# Patient Record
Sex: MALE | Race: ASIAN | NOT HISPANIC OR LATINO | Employment: UNEMPLOYED | URBAN - METROPOLITAN AREA
[De-identification: names, ages, dates, MRNs, and addresses within clinical notes are randomized per-mention and may not be internally consistent; named-entity substitution may affect disease eponyms.]

---
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
## 2019-04-02 ENCOUNTER — HOSPITAL ENCOUNTER (EMERGENCY)
Facility: MEDICAL CENTER | Age: 19
End: 2019-04-02
Attending: EMERGENCY MEDICINE
Payer: MEDICAID

## 2019-04-02 VITALS
TEMPERATURE: 98.2 F | SYSTOLIC BLOOD PRESSURE: 119 MMHG | OXYGEN SATURATION: 96 % | DIASTOLIC BLOOD PRESSURE: 78 MMHG | HEART RATE: 67 BPM | HEIGHT: 67 IN | RESPIRATION RATE: 17 BRPM | BODY MASS INDEX: 22.46 KG/M2 | WEIGHT: 143.08 LBS
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
## 2019-04-02 DIAGNOSIS — S06.0X0A CONCUSSION WITHOUT LOSS OF CONSCIOUSNESS, INITIAL ENCOUNTER: ICD-10-CM

## 2019-04-02 PROCEDURE — 99283 EMERGENCY DEPT VISIT LOW MDM: CPT
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
## 2019-06-07 ENCOUNTER — TELEPHONE (OUTPATIENT)
Dept: NEUROLOGY | Facility: MEDICAL CENTER | Age: 19
End: 2019-06-07

## 2019-06-07 NOTE — TELEPHONE ENCOUNTER
Spoke to pt mom and let her know dr. escobedo resigned from [facility] and we will need to reschedule him with another provider. Mom stated she will notify pt and call us back, I advised her that the appt will be cancelled.

## 2020-03-19 ENCOUNTER — OFFICE VISIT (OUTPATIENT)
Dept: URGENT CARE | Facility: PHYSICIAN GROUP | Age: 20
End: 2020-03-19
Payer: MEDICAID

## 2020-03-19 ENCOUNTER — TELEPHONE (OUTPATIENT)
Dept: HEALTH INFORMATION MANAGEMENT | Facility: OTHER | Age: 20
End: 2020-03-19

## 2020-03-19 VITALS
HEIGHT: 69 IN | RESPIRATION RATE: 16 BRPM | BODY MASS INDEX: 21.44 KG/M2 | DIASTOLIC BLOOD PRESSURE: 70 MMHG | TEMPERATURE: 98.5 F | HEART RATE: 80 BPM | SYSTOLIC BLOOD PRESSURE: 120 MMHG | OXYGEN SATURATION: 98 %

## 2020-03-19 DIAGNOSIS — J02.9 PHARYNGITIS, UNSPECIFIED ETIOLOGY: ICD-10-CM

## 2020-03-19 DIAGNOSIS — J34.89 RHINORRHEA: ICD-10-CM

## 2020-03-19 LAB
FLUAV+FLUBV AG SPEC QL IA: NEGATIVE
INT CON NEG: NORMAL
INT CON NEG: NORMAL
INT CON POS: NORMAL
INT CON POS: NORMAL
S PYO AG THROAT QL: NEGATIVE

## 2020-03-19 PROCEDURE — 87880 STREP A ASSAY W/OPTIC: CPT | Performed by: FAMILY MEDICINE

## 2020-03-19 PROCEDURE — 87804 INFLUENZA ASSAY W/OPTIC: CPT | Performed by: FAMILY MEDICINE

## 2020-03-19 PROCEDURE — 99203 OFFICE O/P NEW LOW 30 MIN: CPT | Performed by: FAMILY MEDICINE

## 2020-03-19 ASSESSMENT — ENCOUNTER SYMPTOMS
EYE DISCHARGE: 0
MYALGIAS: 0
NAUSEA: 0
EYE REDNESS: 0
WEIGHT LOSS: 0
VOMITING: 0

## 2020-03-19 NOTE — PROGRESS NOTES
"Subjective:      Sang Magaña is a 19 y.o. male who presents with Sore Throat (nasel congestion, woke up with it this morning)            Onset this morning ST and nasal congestion. No fever. No cough. No earache. Tolerating fluids with normal urine output. No OTC medications for mild symptoms. No other aggravating or alleviating factors.        Review of Systems   Constitutional: Negative for malaise/fatigue and weight loss.   Eyes: Negative for discharge and redness.   Gastrointestinal: Negative for nausea and vomiting.   Musculoskeletal: Negative for joint pain and myalgias.   Skin: Negative for itching and rash.     .  Medications, Allergies, and current problem list reviewed today in Epic       Objective:     /70   Pulse 80   Temp 36.9 °C (98.5 °F) (Temporal)   Resp 16   Ht 1.74 m (5' 8.5\")   SpO2 98%   BMI 21.44 kg/m²      Physical Exam  Constitutional:       General: He is not in acute distress.     Appearance: He is well-developed.   HENT:      Head: Normocephalic and atraumatic.      Right Ear: Tympanic membrane normal.      Left Ear: Tympanic membrane normal.      Nose: Congestion and rhinorrhea present.      Mouth/Throat:      Pharynx: Posterior oropharyngeal erythema ( Mild) present. No oropharyngeal exudate.   Eyes:      Conjunctiva/sclera: Conjunctivae normal.   Neck:      Musculoskeletal: Neck supple.   Cardiovascular:      Rate and Rhythm: Normal rate and regular rhythm.      Heart sounds: Normal heart sounds. No murmur.   Pulmonary:      Effort: Pulmonary effort is normal.      Breath sounds: Normal breath sounds. No wheezing.   Skin:     General: Skin is warm and dry.      Findings: No rash.   Neurological:      Mental Status: He is alert and oriented to person, place, and time.                 Assessment/Plan:     POCT strep negative  POCT influenza negative    1. Pharyngitis, unspecified etiology  POCT Rapid Strep A    POCT Influenza A/B   2. Rhinorrhea       Differential " diagnosis, natural history, supportive care, and indications for immediate follow-up discussed at length.     Symptoms are mild without evidence of lower respiratory infection or fever.  Low probability of COVID19 without known exposure however self-isolation strategies discussed.

## 2020-03-19 NOTE — TELEPHONE ENCOUNTER
1. Caller Name: Sang                        Call Back Number: [phone]  [facility] PCP or Specialty Provider: Joanie Raya        2.  Does patient have any active symptoms of respiratory illness (fever OR cough OR shortness of breath)? Yes, the patient reports the following respiratory symptoms: sore throat, snuffy nose x 1 day. Temp 97.3 today.    3.  Does patient have any comoribidities? None     4.  In the last 30 days, has the patient traveled outside of the country OR in a high risk area within the  OR have any known contact with someone who has or is suspected to have COVID-19?  No.    5. Disposition: Advised to perform self care, monitor for worsening symptoms and to call back in 3 days if no improvement. He verbalized agreement and understanding.

## 2020-03-23 RX ORDER — LIDOCAINE HYDROCHLORIDE 20 MG/ML
15 SOLUTION OROPHARYNGEAL EVERY 4 HOURS PRN
Qty: 120 ML | Refills: 0 | Status: SHIPPED | OUTPATIENT
Start: 2020-03-23 | End: 2023-05-16

## 2020-09-07 ENCOUNTER — HOSPITAL ENCOUNTER (EMERGENCY)
Facility: MEDICAL CENTER | Age: 20
End: 2020-09-07
Attending: EMERGENCY MEDICINE | Admitting: EMERGENCY MEDICINE
Payer: MEDICAID

## 2020-09-07 VITALS
DIASTOLIC BLOOD PRESSURE: 71 MMHG | RESPIRATION RATE: 18 BRPM | OXYGEN SATURATION: 97 % | BODY MASS INDEX: 23.99 KG/M2 | SYSTOLIC BLOOD PRESSURE: 133 MMHG | TEMPERATURE: 98.2 F | WEIGHT: 158.29 LBS | HEIGHT: 68 IN | HEART RATE: 59 BPM

## 2020-09-07 DIAGNOSIS — M77.9 TENDONITIS: ICD-10-CM

## 2020-09-07 DIAGNOSIS — M25.521 RIGHT ELBOW PAIN: ICD-10-CM

## 2020-09-07 PROCEDURE — 302874 HCHG BANDAGE ACE 2 OR 3""

## 2020-09-07 PROCEDURE — 99283 EMERGENCY DEPT VISIT LOW MDM: CPT

## 2020-09-07 PROCEDURE — 96372 THER/PROPH/DIAG INJ SC/IM: CPT

## 2020-09-07 PROCEDURE — 700111 HCHG RX REV CODE 636 W/ 250 OVERRIDE (IP): Performed by: EMERGENCY MEDICINE

## 2020-09-07 RX ORDER — NAPROXEN 500 MG/1
500 TABLET ORAL 2 TIMES DAILY WITH MEALS
Qty: 60 TAB | Refills: 0 | Status: SHIPPED | OUTPATIENT
Start: 2020-09-07 | End: 2023-05-16

## 2020-09-07 RX ORDER — KETOROLAC TROMETHAMINE 30 MG/ML
30 INJECTION, SOLUTION INTRAMUSCULAR; INTRAVENOUS ONCE
Status: COMPLETED | OUTPATIENT
Start: 2020-09-07 | End: 2020-09-07

## 2020-09-07 RX ADMIN — KETOROLAC TROMETHAMINE 30 MG: 30 INJECTION, SOLUTION INTRAMUSCULAR at 20:39

## 2020-09-07 SDOH — HEALTH STABILITY: MENTAL HEALTH: HOW MANY STANDARD DRINKS CONTAINING ALCOHOL DO YOU HAVE ON A TYPICAL DAY?: 1 OR 2

## 2020-09-07 SDOH — HEALTH STABILITY: MENTAL HEALTH: HOW OFTEN DO YOU HAVE A DRINK CONTAINING ALCOHOL?: MONTHLY OR LESS

## 2020-09-07 SDOH — HEALTH STABILITY: MENTAL HEALTH: HOW OFTEN DO YOU HAVE 6 OR MORE DRINKS ON ONE OCCASION?: NEVER

## 2020-09-07 ASSESSMENT — ENCOUNTER SYMPTOMS
SHORTNESS OF BREATH: 0
SENSORY CHANGE: 0

## 2020-09-08 NOTE — ED PROVIDER NOTES
ED Provider Note    Scribed for Maria Elena Corona M.D. by Allan Morales. 9/7/2020, 8:24 PM.    Primary care provider: Rian Raya D.N.P.  Means of arrival: walk in  History obtained from: patient  History limited by: none    CHIEF COMPLAINT  Chief Complaint   Patient presents with   • Arm Pain     R upper arm, denies trauma, no deformity noted.    PPE Note: I personally donned PPE for all patient encounters during this visit, including a surgical mask and gloves. Scribe remained outside the patient's room and did not have any contact with the patient for the duration of patient encounter.      DENA Magaña is a 20 y.o. male who presents to the Emergency Department with moderate throbbing right arm pain spontaneously onset earlier today.  Per the patient, earlier today his right arm suddenly began hurting, he noticed after bike riding today. He states that he ignored the pain and later on boxed with a friend; afterwards the pain worsened, which is why he is in the ED today.  He denies hitting his arm or falling on it.  He describes that the pain is localized to the right elbow but cannot localize it to the medial or lateral aspect of the elbow.  Prior to arrival, he took 500 mg of tylenol with mild alleviation; his pain is exacerbated by bending his arm.  Denies numbness, tingling or weakness.    REVIEW OF SYSTEMS  Review of Systems   Respiratory: Negative for shortness of breath.    Cardiovascular: Negative for chest pain.   Musculoskeletal: Positive for joint pain.        Right arm pain worse around the elbow   Neurological: Negative for sensory change.     PAST MEDICAL HISTORY   has a past medical history of Epilepsy (HCC), Hypoactive thyroid (2010), and Seizure disorder (HCC).    SURGICAL HISTORY  patient denies any surgical history    SOCIAL HISTORY  Social History     Tobacco Use   • Smoking status: Former Smoker   • Smokeless tobacco: Never Used   Substance Use Topics   • Alcohol use: Yes      "Frequency: Monthly or less     Drinks per session: 1 or 2     Binge frequency: Never   • Drug use: No      Social History     Substance and Sexual Activity   Drug Use No       FAMILY HISTORY  History reviewed. No pertinent family history.    CURRENT MEDICATIONS  Current Outpatient Medications   Medication Instructions   • divalproex ER (DEPAKOTE ER) 500 mg, Oral, 2 TIMES DAILY   • lamotrigine (LAMICTAL) 200 mg, Oral, DAILY   • levETIRAcetam (KEPPRA) 500 mg, Oral, 2 TIMES DAILY   • levothyroxine (SYNTHROID) 88 mcg, Oral, EACH MORNING ON EMPTY STOMACH   • lidocaine (XYLOCAINE) 2 % Solution 15 mL, Oral, EVERY 4 HOURS PRN, Gargle and spit every 4 hours as needed     ALLERGIES  No Known Allergies    PHYSICAL EXAM  VITAL SIGNS: /79   Pulse 76   Temp 36.2 °C (97.2 °F)   Resp 16   Ht 1.727 m (5' 8\")   Wt 71.8 kg (158 lb 4.6 oz)   SpO2 97%   BMI 24.07 kg/m²   Vitals reviewed by myself.  Physical Exam   Nursing note and vitals reviewed.  Constitutional: Well-developed and well-nourished. No acute distress.   HENT: Head is normocephalic and atraumatic.  Eyes: extra-ocular movements intact  Cardiovascular: Regular rate and regular rhythm. No murmur heard.  2+ bilateral radial pulses  Pulmonary/Chest: Breath sounds normal. No wheezes or rales.   Musculoskeletal: Extremities exhibit normal range of motion without edema or tenderness.  Patient has full range of motion of bilateral shoulders, elbows and wrists.  No swelling or deformity noted to the arm or forearms bilaterally.  No point tenderness to palpation along the lateral or medial epicondyle.  Neurological: Awake and alert, sensation intact to median, ulnar, axillary and radial nerve distributions bilaterally.  Skin: Skin is warm and dry. No rash.       REASSESSMENT    8:24 PM - Patient seen and examined at bedside. Discussed plan of care, including my plans for discharge. I offered to order imaging to rule out any breaks in his arms but the patient stated that " he didn't want imaging as he is confident he didn't break the bone. The patient will be medicated with Toradol 30mg.     COURSE & MEDICAL DECISION MAKING  Nursing notes, VS, PMSFHx reviewed in chart.    Patient is a 20-year-old male who comes in for evaluation of right upper extremity pain.  On physical exam patient is well-appearing with vitals in normal limits.  He has intact distal pulses and warm extremities ruling out arterial occlusion.  There is no obvious swelling or tenderness to palpation of the forearm or arm and patient has no risk factors for blood clot making DVT and superficial thrombophlebitis unlikely.  I asked patient if he uses IV drugs and he declines IV drug use.  There is no erythema or warmth to the extremity concerning for infection.  I advised him that he likely has a musculoskeletal strain or tendinitis after riding his bike.  I offered him x-ray to rule out bony abnormality although patient declined this.  Therefore I will start him on anti-inflammatories and have him follow-up with [facility] if his symptoms do not improve.  Patient is amenable to this plan.  He is given dose of Toradol in the emergency department, provided with prescription for naproxen and given strict return precautions.  Patient is then discharged in stable condition.    The patient will return for new or worsening symptoms and is stable at the time of discharge.    The patient is referred to a primary physician for blood pressure management, diabetic screening, and for all other preventative health concerns.    DISPOSITION:  Patient will be discharged home in stable condition.    FOLLOW UP:  [facility]  [address]  [facility] [address] [phone]          OUTPATIENT MEDICATIONS:  New Prescriptions    NAPROXEN (NAPROSYN) 500 MG TAB    Take 1 Tab by mouth 2 times a day, with meals.       FINAL IMPRESSION  1. Tendonitis    2. Right elbow pain          IAllan), audra  scribing for, and in the presence of, Maria Elena Corona M.D..    Electronically signed by: Allan Morales (Scribe), 9/7/2020    I, Maria Elena Corona M.D. personally performed the services described in this documentation, as scribed by Allan Morales in my presence, and it is both accurate and complete.    E    The note accurately reflects work and decisions made by me.  Maria Elena Corona M.D.  9/7/2020  8:39 PM

## 2020-09-08 NOTE — ED TRIAGE NOTES
Chief Complaint   Patient presents with   • Arm Pain     R upper arm, denies trauma, no deformity noted.      Patient to triage via ambulaiton, with a steady gait, patient A&O x4. Radial pulse 2+, denies n/t.      Explained wait time and triage process to pt. Pt placed back in lobby, told to notify ED tech or triage RN of any changes, verbalized understanding.

## 2020-10-01 ENCOUNTER — HOSPITAL ENCOUNTER (OUTPATIENT)
Dept: LAB | Facility: MEDICAL CENTER | Age: 20
End: 2020-10-01
Attending: PSYCHIATRY & NEUROLOGY
Payer: MEDICAID

## 2020-10-01 LAB — RHEUMATOID FACT SER IA-ACNC: <10 IU/ML (ref 0–14)

## 2020-10-01 PROCEDURE — 84443 ASSAY THYROID STIM HORMONE: CPT

## 2020-10-01 PROCEDURE — 86800 THYROGLOBULIN ANTIBODY: CPT

## 2020-10-01 PROCEDURE — 86431 RHEUMATOID FACTOR QUANT: CPT

## 2020-10-01 PROCEDURE — 36415 COLL VENOUS BLD VENIPUNCTURE: CPT

## 2020-10-01 PROCEDURE — 86376 MICROSOMAL ANTIBODY EACH: CPT

## 2020-10-02 LAB
THYROPEROXIDASE AB SERPL-ACNC: <9 IU/ML (ref 0–9)
TSH SERPL DL<=0.005 MIU/L-ACNC: 1.29 UIU/ML (ref 0.38–5.33)

## 2020-10-03 LAB — THYROGLOB AB SERPL-ACNC: <0.9 IU/ML (ref 0–4)

## 2020-11-01 ENCOUNTER — HOSPITAL ENCOUNTER (EMERGENCY)
Facility: MEDICAL CENTER | Age: 20
End: 2020-11-01
Attending: EMERGENCY MEDICINE
Payer: MEDICAID

## 2020-11-01 VITALS
DIASTOLIC BLOOD PRESSURE: 63 MMHG | BODY MASS INDEX: 22.88 KG/M2 | TEMPERATURE: 99 F | HEIGHT: 68 IN | RESPIRATION RATE: 16 BRPM | HEART RATE: 65 BPM | WEIGHT: 151 LBS | OXYGEN SATURATION: 98 % | SYSTOLIC BLOOD PRESSURE: 125 MMHG

## 2020-11-01 DIAGNOSIS — Z20.822 SUSPECTED COVID-19 VIRUS INFECTION: ICD-10-CM

## 2020-11-01 DIAGNOSIS — J06.9 VIRAL URI WITH COUGH: ICD-10-CM

## 2020-11-01 LAB
COVID ORDER STATUS COVID19: NORMAL
S PYO DNA SPEC NAA+PROBE: NOT DETECTED
SARS-COV-2 RNA RESP QL NAA+PROBE: NOTDETECTED
SPECIMEN SOURCE: NORMAL

## 2020-11-01 PROCEDURE — C9803 HOPD COVID-19 SPEC COLLECT: HCPCS | Performed by: EMERGENCY MEDICINE

## 2020-11-01 PROCEDURE — U0003 INFECTIOUS AGENT DETECTION BY NUCLEIC ACID (DNA OR RNA); SEVERE ACUTE RESPIRATORY SYNDROME CORONAVIRUS 2 (SARS-COV-2) (CORONAVIRUS DISEASE [COVID-19]), AMPLIFIED PROBE TECHNIQUE, MAKING USE OF HIGH THROUGHPUT TECHNOLOGIES AS DESCRIBED BY CMS-2020-01-R: HCPCS

## 2020-11-01 PROCEDURE — 87651 STREP A DNA AMP PROBE: CPT

## 2020-11-01 PROCEDURE — 99283 EMERGENCY DEPT VISIT LOW MDM: CPT

## 2020-11-01 ASSESSMENT — LIFESTYLE VARIABLES: DO YOU DRINK ALCOHOL: NO

## 2020-11-01 NOTE — ED TRIAGE NOTES
"Chief Complaint   Patient presents with   • Cough     x 1 day. congested    • Sore Throat     x 1 day       Pt amb to triage with steady gait for above complaint. Also c/o chills. Was around a large group of people over past few days, unsure of covid exposure.     Pt is alert and oriented, speaking in full sentences, follows commands and responds appropriately to questions. Resp are even and unlabored. No behavioral indicators of pain.   Pt placed in lobby. Pt educated on triage process. Pt encouraged to alert staff for any changes.    /74   Pulse 77   Temp 37.2 °C (99 °F) (Temporal)   Resp 14   Ht 1.727 m (5' 8\")   Wt 68.5 kg (151 lb)   SpO2 96%   BMI 22.96 kg/m²     "

## 2020-11-01 NOTE — ED PROVIDER NOTES
ED Provider Note    CHIEF COMPLAINT  Chief Complaint   Patient presents with   • Cough     x 1 day. congested    • Sore Throat     x 1 day       HPI  Sang Magaña is a 20 y.o. male who presents with sore throat, malaise, and cough.  Onset of symptoms yesterday, gradually worsening.  No shortness of breath.  No chest pain, no abdominal pain.  No vomiting, no loss of taste.  Patient denies known exposure to COVID-19.  Patient states he occasionally vapes otherwise does not smoke.  Activity worsens cough    REVIEW OF SYSTEMS  Constitutional: Malaise  Respiratory: Cough  ENT sore throat  Gastrointestinal: No abdominal pain  Musculoskeletal: No back pain    PAST MEDICAL HISTORY  Past Medical History:   Diagnosis Date   • Epilepsy (HCC)    • Hypoactive thyroid 2010   • Seizure disorder (HCC)        FAMILY HISTORY  No family history on file.    SOCIAL HISTORY  Social History     Socioeconomic History   • Marital status: Single     Spouse name: Not on file   • Number of children: Not on file   • Years of education: Not on file   • Highest education level: Not on file   Occupational History   • Not on file   Social Needs   • Financial resource strain: Not on file   • Food insecurity     Worry: Not on file     Inability: Not on file   • Transportation needs     Medical: Not on file     Non-medical: Not on file   Tobacco Use   • Smoking status: Former Smoker   • Smokeless tobacco: Never Used   Substance and Sexual Activity   • Alcohol use: Yes     Frequency: Monthly or less     Drinks per session: 1 or 2     Binge frequency: Never   • Drug use: No   • Sexual activity: Not on file   Lifestyle   • Physical activity     Days per week: Not on file     Minutes per session: Not on file   • Stress: Not on file   Relationships   • Social connections     Talks on phone: Not on file     Gets together: Not on file     Attends [employer] service: Not on file     Active member of club or organization: Not on file     Attends meetings  "of clubs or organizations: Not on file     Relationship status: Not on file   • Intimate partner violence     Fear of current or ex partner: Not on file     Emotionally abused: Not on file     Physically abused: Not on file     Forced sexual activity: Not on file   Other Topics Concern   • Not on file   Social History Narrative    ** Merged History Encounter **            SURGICAL HISTORY  History reviewed. No pertinent surgical history.    CURRENT MEDICATIONS  No current facility-administered medications on file prior to encounter.      Current Outpatient Medications on File Prior to Encounter   Medication Sig Dispense Refill   • naproxen (NAPROSYN) 500 MG Tab Take 1 Tab by mouth 2 times a day, with meals. 60 Tab 0   • lidocaine (XYLOCAINE) 2 % Solution Take 15 mL by mouth every four hours as needed for Throat/Mouth Pain. Gargle and spit every 4 hours as needed 120 mL 0   • lamotrigine (LAMICTAL) 200 MG tablet Take 1 Tab by mouth every day. 90 Tab 1   • divalproex ER (DEPAKOTE ER) 500 MG TABLET SR 24 HR Take 1 Tab by mouth 2 Times a Day. 180 Tab 1   • levETIRAcetam (KEPPRA) 500 MG Tab Take 1 Tab by mouth 2 times a day. 180 Tab 1   • levothyroxine (SYNTHROID) 88 MCG Tab Take 1 Tab by mouth Every morning on an empty stomach. 30 Tab 0       ALLERGIES  No Known Allergies    PHYSICAL EXAM  VITAL SIGNS: /70   Pulse 60   Temp 37.2 °C (99 °F) (Temporal)   Resp 16   Ht 1.727 m (5' 8\")   Wt 68.5 kg (151 lb)   SpO2 95%   BMI 22.96 kg/m²   Constitutional:  Well nourished, No acute distress.   HENT: Posterior pharynx erythematous, no exudate, no asymmetric swelling  Lymphatics: Mild symmetrical adenopathy  Eyes:  Conjunctiva normal, No discharge.    Cardiovascular: The heart is regular rhythm, normal rate  Pulmonary: Lungs with mildly diminished breath sounds bilateral, no crackles or wheezing  Skin: No cyanosis.   Musculoskeletal: Neck nontender   Neurologic: speech is clear, no ataxia   Psychiatric:  Mood normal. "  Cooperative    Results for orders placed or performed during the hospital encounter of 11/01/20   Group A Strep by PCR    Specimen: Throat   Result Value Ref Range    Group A Strep by PCR Not Detected Not Detected   COVID/SARS CoV-2 PCR    Specimen: Nasopharyngeal; Respirate   Result Value Ref Range    COVID Order Status Received    SARS-CoV-2, PCR (In-House)   Result Value Ref Range    SARS-CoV-2 Source NP Swab            COURSE & MEDICAL DECISION MAKING  Pertinent Labs & Imaging studies reviewed. (See chart for details)  Patient with viral symptoms, cough, sore throat.  His strep test is negative.  Symptoms are suspicious for COVID-19 infection, patient advised to self isolate and socially distance until results are known then act accordingly.  Per protocol, patient will be contacted tomorrow if his results are positive.  Known Covid causes of viral URI are also possible.  Patient has normal pulse oximetry, occasionally coughs, otherwise no respiratory distress.  He is stable for discharge    FINAL IMPRESSION     1. Viral URI with cough     2. Suspected COVID-19 virus infection                  Electronically signed by: Dada Hernandes M.D., 11/1/2020 7:38 AM

## 2020-11-05 ENCOUNTER — NURSE TRIAGE (OUTPATIENT)
Dept: HEALTH INFORMATION MANAGEMENT | Facility: OTHER | Age: 20
End: 2020-11-05

## 2020-11-05 NOTE — TELEPHONE ENCOUNTER
Recently tested for covid, wants results.  Referred to main lab @ [facility].

## 2021-01-06 ENCOUNTER — HOSPITAL ENCOUNTER (EMERGENCY)
Facility: MEDICAL CENTER | Age: 21
End: 2021-01-06
Attending: EMERGENCY MEDICINE | Admitting: EMERGENCY MEDICINE
Payer: MEDICAID

## 2021-01-06 VITALS
DIASTOLIC BLOOD PRESSURE: 71 MMHG | HEART RATE: 60 BPM | BODY MASS INDEX: 24.25 KG/M2 | OXYGEN SATURATION: 96 % | HEIGHT: 68 IN | SYSTOLIC BLOOD PRESSURE: 102 MMHG | TEMPERATURE: 98.8 F | WEIGHT: 160 LBS | RESPIRATION RATE: 15 BRPM

## 2021-01-06 DIAGNOSIS — R56.9 SEIZURE (HCC): ICD-10-CM

## 2021-01-06 LAB
ALBUMIN SERPL BCP-MCNC: 4.5 G/DL (ref 3.2–4.9)
ALBUMIN/GLOB SERPL: 2 G/DL
ALP SERPL-CCNC: 55 U/L (ref 30–99)
ALT SERPL-CCNC: 14 U/L (ref 2–50)
ANION GAP SERPL CALC-SCNC: 11 MMOL/L (ref 7–16)
AST SERPL-CCNC: 20 U/L (ref 12–45)
BASOPHILS # BLD AUTO: 0.8 % (ref 0–1.8)
BASOPHILS # BLD: 0.05 K/UL (ref 0–0.12)
BILIRUB SERPL-MCNC: 0.3 MG/DL (ref 0.1–1.5)
BUN SERPL-MCNC: 17 MG/DL (ref 8–22)
CALCIUM SERPL-MCNC: 9.6 MG/DL (ref 8.5–10.5)
CHLORIDE SERPL-SCNC: 105 MMOL/L (ref 96–112)
CO2 SERPL-SCNC: 23 MMOL/L (ref 20–33)
CREAT SERPL-MCNC: 0.84 MG/DL (ref 0.5–1.4)
EKG IMPRESSION: NORMAL
EOSINOPHIL # BLD AUTO: 0.11 K/UL (ref 0–0.51)
EOSINOPHIL NFR BLD: 1.8 % (ref 0–6.9)
ERYTHROCYTE [DISTWIDTH] IN BLOOD BY AUTOMATED COUNT: 36.5 FL (ref 35.9–50)
ETHANOL BLD-MCNC: <10.1 MG/DL (ref 0–10)
GLOBULIN SER CALC-MCNC: 2.3 G/DL (ref 1.9–3.5)
GLUCOSE SERPL-MCNC: 120 MG/DL (ref 65–99)
HCT VFR BLD AUTO: 45.2 % (ref 42–52)
HGB BLD-MCNC: 16 G/DL (ref 14–18)
IMM GRANULOCYTES # BLD AUTO: 0.01 K/UL (ref 0–0.11)
IMM GRANULOCYTES NFR BLD AUTO: 0.2 % (ref 0–0.9)
LYMPHOCYTES # BLD AUTO: 2.35 K/UL (ref 1–4.8)
LYMPHOCYTES NFR BLD: 38.4 % (ref 22–41)
MAGNESIUM SERPL-MCNC: 1.9 MG/DL (ref 1.5–2.5)
MCH RBC QN AUTO: 31.5 PG (ref 27–33)
MCHC RBC AUTO-ENTMCNC: 35.4 G/DL (ref 33.7–35.3)
MCV RBC AUTO: 89 FL (ref 81.4–97.8)
MONOCYTES # BLD AUTO: 0.55 K/UL (ref 0–0.85)
MONOCYTES NFR BLD AUTO: 9 % (ref 0–13.4)
NEUTROPHILS # BLD AUTO: 3.05 K/UL (ref 1.82–7.42)
NEUTROPHILS NFR BLD: 49.8 % (ref 44–72)
NRBC # BLD AUTO: 0 K/UL
NRBC BLD-RTO: 0 /100 WBC
PLATELET # BLD AUTO: 229 K/UL (ref 164–446)
PMV BLD AUTO: 10.4 FL (ref 9–12.9)
POTASSIUM SERPL-SCNC: 3.9 MMOL/L (ref 3.6–5.5)
PROT SERPL-MCNC: 6.8 G/DL (ref 6–8.2)
RBC # BLD AUTO: 5.08 M/UL (ref 4.7–6.1)
SODIUM SERPL-SCNC: 139 MMOL/L (ref 135–145)
T4 FREE SERPL-MCNC: 1.37 NG/DL (ref 0.93–1.7)
TSH SERPL DL<=0.005 MIU/L-ACNC: 0.8 UIU/ML (ref 0.38–5.33)
WBC # BLD AUTO: 6.1 K/UL (ref 4.8–10.8)

## 2021-01-06 PROCEDURE — 700111 HCHG RX REV CODE 636 W/ 250 OVERRIDE (IP): Performed by: EMERGENCY MEDICINE

## 2021-01-06 PROCEDURE — 84439 ASSAY OF FREE THYROXINE: CPT

## 2021-01-06 PROCEDURE — 93005 ELECTROCARDIOGRAM TRACING: CPT | Performed by: EMERGENCY MEDICINE

## 2021-01-06 PROCEDURE — 700102 HCHG RX REV CODE 250 W/ 637 OVERRIDE(OP): Performed by: EMERGENCY MEDICINE

## 2021-01-06 PROCEDURE — 84443 ASSAY THYROID STIM HORMONE: CPT

## 2021-01-06 PROCEDURE — 82077 ASSAY SPEC XCP UR&BREATH IA: CPT

## 2021-01-06 PROCEDURE — 85025 COMPLETE CBC W/AUTO DIFF WBC: CPT

## 2021-01-06 PROCEDURE — 80053 COMPREHEN METABOLIC PANEL: CPT

## 2021-01-06 PROCEDURE — A9270 NON-COVERED ITEM OR SERVICE: HCPCS | Performed by: EMERGENCY MEDICINE

## 2021-01-06 PROCEDURE — 83735 ASSAY OF MAGNESIUM: CPT

## 2021-01-06 PROCEDURE — 99284 EMERGENCY DEPT VISIT MOD MDM: CPT

## 2021-01-06 PROCEDURE — 96374 THER/PROPH/DIAG INJ IV PUSH: CPT

## 2021-01-06 PROCEDURE — 700105 HCHG RX REV CODE 258: Performed by: EMERGENCY MEDICINE

## 2021-01-06 RX ORDER — ONDANSETRON 2 MG/ML
4 INJECTION INTRAMUSCULAR; INTRAVENOUS ONCE
Status: COMPLETED | OUTPATIENT
Start: 2021-01-06 | End: 2021-01-06

## 2021-01-06 RX ORDER — FOLIC ACID 1 MG/1
1 TABLET ORAL DAILY
Status: SHIPPED | COMMUNITY
End: 2023-05-19 | Stop reason: SDUPTHER

## 2021-01-06 RX ORDER — SODIUM CHLORIDE, SODIUM LACTATE, POTASSIUM CHLORIDE, CALCIUM CHLORIDE 600; 310; 30; 20 MG/100ML; MG/100ML; MG/100ML; MG/100ML
1000 INJECTION, SOLUTION INTRAVENOUS ONCE
Status: COMPLETED | OUTPATIENT
Start: 2021-01-06 | End: 2021-01-06

## 2021-01-06 RX ORDER — LEVETIRACETAM 500 MG/1
500 TABLET ORAL 2 TIMES DAILY
Status: DISCONTINUED | OUTPATIENT
Start: 2021-01-06 | End: 2021-01-06 | Stop reason: HOSPADM

## 2021-01-06 RX ORDER — DIVALPROEX SODIUM 500 MG/1
500 TABLET, EXTENDED RELEASE ORAL 2 TIMES DAILY
Status: DISCONTINUED | OUTPATIENT
Start: 2021-01-06 | End: 2021-01-06 | Stop reason: HOSPADM

## 2021-01-06 RX ADMIN — SODIUM CHLORIDE, POTASSIUM CHLORIDE, SODIUM LACTATE AND CALCIUM CHLORIDE 1000 ML: 600; 310; 30; 20 INJECTION, SOLUTION INTRAVENOUS at 06:26

## 2021-01-06 RX ADMIN — LEVETIRACETAM 500 MG: 500 TABLET ORAL at 08:11

## 2021-01-06 RX ADMIN — ONDANSETRON 4 MG: 2 INJECTION INTRAMUSCULAR; INTRAVENOUS at 06:26

## 2021-01-06 RX ADMIN — DIVALPROEX SODIUM 500 MG: 500 TABLET, EXTENDED RELEASE ORAL at 08:11

## 2021-01-06 NOTE — ED TRIAGE NOTES
".  Chief Complaint   Patient presents with   • Seizure     Patient has a history of seizures, his last seizure was a year ago. Per patient and his friend/\"provider\" patient started having seizure like activity but remained awake during the event. Patient A/Ox4 but laughing at everything stating \"why is everything so funny to me.\" Patient denies taking any other substances. Per provider patient forgot to take his depakote last night.      Patient brought in by EMS. Patient given 4mg Zofran per EMS. Patient reporting Nausea. Patient reports feeling tired. Provider/friend at bedside. Provider describes seizure like activity as fully body tremors with his eyes closed but patient was still able to speak.   "

## 2021-01-06 NOTE — ED PROVIDER NOTES
"ED Provider Note    CHIEF COMPLAINT  Chief Complaint   Patient presents with   • Seizure     Patient has a history of seizures, his last seizure was a year ago. Per patient and his friend/\"provider\" patient started having seizure like activity but remained awake during the event. Patient A/Ox4 but laughing at everything stating \"why is everything so funny to me.\" Patient denies taking any other substances. Per provider patient forgot to take his depakote last night.        HPI  Sang Magaña is a 20 y.o. male who presents after having seizure-like activity.  Patient has a history of seizure disorder.  He has been compliant with his medications aside from missing them yesterday.  Today he told his friend/\"provider\" that he was going to have a seizure.  He started thrashing his arms and legs around violently.  He was kicking his legs and arms.  His friend characterize this as appearing as if the patient was having a temper tantrum.  The patient was awake and alert during this event.  It lasted for about 35 minutes.  He did not have incontinence or tongue biting.  He denies any injury.  He currently feels nauseous.  He has not vomited.  He has been dealing with insomnia for the last few days and has been having a very hard time sleeping.  He is was watching a movie when this all started.  He denies fevers, chills, headache, weakness, numbness, neurologic symptoms.  No chest pain, shortness of breath or cough.  Denies dysuria hematuria frequency.  Denies abdominal pain.  No vomiting or diarrhea.  No rash.  No known ill contacts.  Denies drugs or alcohol.  No suicidal homicidal thoughts.  Denies hallucinations.    Reviewed medical record.  Patient followed by Dr. Mann.  There is been question of psychiatric issues or psychogenic seizure in the past.  He does have known epilepsy however.    REVIEW OF SYSTEMS  As per HPI, otherwise a 10 point review of systems is negative    PAST MEDICAL HISTORY  Past Medical History: " "  Diagnosis Date   • Epilepsy (HCC)    • Hypoactive thyroid 2010   • Seizure disorder (HCC)        SOCIAL HISTORY  Social History     Tobacco Use   • Smoking status: Former Smoker   • Smokeless tobacco: Never Used   Substance Use Topics   • Alcohol use: Yes     Frequency: Monthly or less     Drinks per session: 1 or 2     Binge frequency: Never   • Drug use: No       SURGICAL HISTORY  History reviewed. No pertinent surgical history.    CURRENT MEDICATIONS  Home Medications    **Home medications have not yet been reviewed for this encounter**         ALLERGIES  No Known Allergies    PHYSICAL EXAM  VITAL SIGNS: /84   Pulse 96   Temp 37.1 °C (98.8 °F) (Temporal)   Resp (!) 21   Ht 1.727 m (5' 8\")   Wt 72.6 kg (160 lb)   SpO2 98%   BMI 24.33 kg/m²    Constitutional: Awake and alert.  Somewhat slow to answer questions  HENT:  Atraumatic, Normocephalic.Oropharynx dry mucus membranes, Nose normal inspection.   Eyes: Normal inspection  Neck: Supple  Cardiovascular: Mildly elevated heart rate, Normal rhythm.  Symmetric peripheral pulses.   Thorax & Lungs: No respiratory distress, No wheezing, No rales, No rhonchi, No chest tenderness.   Abdomen: Bowel sounds normal, soft, non-distended, nontender, no mass  Skin: Warm, Dry, No rash.   Back: No tenderness, No CVA tenderness.   Extremities: No clubbing, cyanosis, edema, no Homans or cords   Neurologic: Awake and alert.  Oriented to person place time.  Moves all extremities symmetrically with good strength.  Normal sensory.  Psychiatric: Anxious appearing        Labs:  Results for orders placed or performed during the hospital encounter of 01/06/21   CBC WITH DIFFERENTIAL   Result Value Ref Range    WBC 6.1 4.8 - 10.8 K/uL    RBC 5.08 4.70 - 6.10 M/uL    Hemoglobin 16.0 14.0 - 18.0 g/dL    Hematocrit 45.2 42.0 - 52.0 %    MCV 89.0 81.4 - 97.8 fL    MCH 31.5 27.0 - 33.0 pg    MCHC 35.4 (H) 33.7 - 35.3 g/dL    RDW 36.5 35.9 - 50.0 fL    Platelet Count 229 164 - 446 " K/uL    MPV 10.4 9.0 - 12.9 fL    Neutrophils-Polys 49.80 44.00 - 72.00 %    Lymphocytes 38.40 22.00 - 41.00 %    Monocytes 9.00 0.00 - 13.40 %    Eosinophils 1.80 0.00 - 6.90 %    Basophils 0.80 0.00 - 1.80 %    Immature Granulocytes 0.20 0.00 - 0.90 %    Nucleated RBC 0.00 /100 WBC    Neutrophils (Absolute) 3.05 1.82 - 7.42 K/uL    Lymphs (Absolute) 2.35 1.00 - 4.80 K/uL    Monos (Absolute) 0.55 0.00 - 0.85 K/uL    Eos (Absolute) 0.11 0.00 - 0.51 K/uL    Baso (Absolute) 0.05 0.00 - 0.12 K/uL    Immature Granulocytes (abs) 0.01 0.00 - 0.11 K/uL    NRBC (Absolute) 0.00 K/uL   COMP METABOLIC PANEL   Result Value Ref Range    Sodium 139 135 - 145 mmol/L    Potassium 3.9 3.6 - 5.5 mmol/L    Chloride 105 96 - 112 mmol/L    Co2 23 20 - 33 mmol/L    Anion Gap 11.0 7.0 - 16.0    Glucose 120 (H) 65 - 99 mg/dL    Bun 17 8 - 22 mg/dL    Creatinine 0.84 0.50 - 1.40 mg/dL    Calcium 9.6 8.5 - 10.5 mg/dL    AST(SGOT) 20 12 - 45 U/L    ALT(SGPT) 14 2 - 50 U/L    Alkaline Phosphatase 55 30 - 99 U/L    Total Bilirubin 0.3 0.1 - 1.5 mg/dL    Albumin 4.5 3.2 - 4.9 g/dL    Total Protein 6.8 6.0 - 8.2 g/dL    Globulin 2.3 1.9 - 3.5 g/dL    A-G Ratio 2.0 g/dL   MAGNESIUM   Result Value Ref Range    Magnesium 1.9 1.5 - 2.5 mg/dL   DIAGNOSTIC ALCOHOL   Result Value Ref Range    Diagnostic Alcohol <10.1 0.0 - 10.0 mg/dL   TSH   Result Value Ref Range    TSH 0.796 0.380 - 5.330 uIU/mL   FREE THYROXINE   Result Value Ref Range    Free T-4 1.37 0.93 - 1.70 ng/dL   ESTIMATED GFR   Result Value Ref Range    GFR If African American >60 >60 mL/min/1.73 m 2    GFR If Non African American >60 >60 mL/min/1.73 m 2   EKG   Result Value Ref Range    Report       [facility] Emergency Dept.    Test Date:  2021  Pt Name:    ANGELIA PARHAM                 Department: ER  MRN:        [id number]                      Room:       [facility]  Gender:     Male                         Technician: [id number]  :        2000                    Requested By:CANDIE ALEMAN  Order #:    [id number]                    Reading MD: CANDIE ALEMAN MD    Measurements  Intervals                                Axis  Rate:       85                           P:          72  ND:         156                          QRS:        56  QRSD:       90                           T:          18  QT:         340  QTc:        405    Interpretive Statements  SINUS RHYTHM  Nonspecific concave upward J-point elevation  No previous ECG available for comparison  Electronically Signed On 1-6-2021 6:53:47 PST by CANDIE ALEMAN MD         Medications   levETIRAcetam (KEPPRA) tablet 500 mg (500 mg Oral Given 1/6/21 0811)   divalproex ER (DEPAKOTE ER) tablet 500 mg (500 mg Oral Given 1/6/21 0811)   lactated ringers infusion (BOLUS) (0 mL Intravenous Stopped 1/6/21 0743)   ondansetron (ZOFRAN) syringe/vial injection 4 mg (4 mg Intravenous Given 1/6/21 0626)       HYDRATION: Based on the patient's presentation of Tachycardia the patient was given IV fluids. IV Hydration was used because oral hydration was not adequate alone. Upon recheck following hydration, the patient was Improved.    COURSE & MEDICAL DECISION MAKING  Patient presents to the ER with seizure-like activity.  Sounds somewhat atypical for true seizure with thrashing arms about as well has maintained alertness; however, patient does have a known seizure disorder and he also did not take his medications yesterday.  He was given his daily meds here in the ER.  He was nauseous given Zofran.  He was given IV fluids because of tachycardia.  Obtain laboratory data.    Data returned as above.  I ordered urine drug screen, but the patient was not willing to provide a urine sample and wanted to go home.  At this point I suspect seizures could be related to noncompliance as well as insomnia.  He is given advice regarding insomnia treatment.  Advised melatonin and/or Benadryl at home.  He will continue his regular medications.  He  should call his neurologist today to set up an appointment as soon as possible.  He was precautioned to return to the ER for recurrent events, fevers, illness or other concerning symptoms.      FINAL IMPRESSION  1.  Seizure-like activity      This dictation was created using voice recognition software. The accuracy of the dictation is limited to the abilities of the software.  The nursing notes were reviewed and certain aspects of this information were incorporated into this note.      Electronically signed by: Oscar Lakhani M.D., 1/6/2021 6:49 AM

## 2021-01-06 NOTE — ED NOTES
Pt discharged home as ordered by erp. Pt instructed to follow up with neurology tomorrow and return here as needed. Pt verbalized understanding and left ambulating independently with his friend

## 2021-01-26 ENCOUNTER — HOSPITAL ENCOUNTER (OUTPATIENT)
Dept: LAB | Facility: MEDICAL CENTER | Age: 21
End: 2021-01-26
Attending: NURSE PRACTITIONER
Payer: MEDICAID

## 2021-01-26 LAB — VALPROATE SERPL-MCNC: 79.5 UG/ML (ref 50–100)

## 2021-01-26 PROCEDURE — 80175 DRUG SCREEN QUAN LAMOTRIGINE: CPT

## 2021-01-26 PROCEDURE — 80164 ASSAY DIPROPYLACETIC ACD TOT: CPT

## 2021-01-26 PROCEDURE — 36415 COLL VENOUS BLD VENIPUNCTURE: CPT

## 2021-01-28 LAB — LAMOTRIGINE SERPL-MCNC: 10.8 UG/ML (ref 2.5–15)

## 2021-11-27 ENCOUNTER — APPOINTMENT (OUTPATIENT)
Dept: RADIOLOGY | Facility: MEDICAL CENTER | Age: 21
End: 2021-11-27
Attending: EMERGENCY MEDICINE
Payer: MEDICAID

## 2021-11-27 ENCOUNTER — HOSPITAL ENCOUNTER (EMERGENCY)
Facility: MEDICAL CENTER | Age: 21
End: 2021-11-27
Attending: EMERGENCY MEDICINE
Payer: MEDICAID

## 2021-11-27 VITALS
TEMPERATURE: 97.5 F | DIASTOLIC BLOOD PRESSURE: 69 MMHG | WEIGHT: 162.26 LBS | HEART RATE: 68 BPM | BODY MASS INDEX: 23.23 KG/M2 | SYSTOLIC BLOOD PRESSURE: 117 MMHG | HEIGHT: 70 IN | OXYGEN SATURATION: 97 % | RESPIRATION RATE: 16 BRPM

## 2021-11-27 DIAGNOSIS — S39.92XA INJURY OF BACK, INITIAL ENCOUNTER: ICD-10-CM

## 2021-11-27 DIAGNOSIS — M54.50 ACUTE BILATERAL LOW BACK PAIN WITHOUT SCIATICA: ICD-10-CM

## 2021-11-27 DIAGNOSIS — M54.50 LUMBAR BACK PAIN: ICD-10-CM

## 2021-11-27 PROCEDURE — 72070 X-RAY EXAM THORAC SPINE 2VWS: CPT

## 2021-11-27 PROCEDURE — 99283 EMERGENCY DEPT VISIT LOW MDM: CPT

## 2021-11-27 ASSESSMENT — FIBROSIS 4 INDEX: FIB4 SCORE: 0.51
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
## 2021-11-28 NOTE — DISCHARGE INSTRUCTIONS
XR Thoracic spine:  1.  No thoracic compression fracture or subluxation.  2.  Thoracolumbar scoliosis.
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
## 2023-05-12 ENCOUNTER — HOSPITAL ENCOUNTER (EMERGENCY)
Facility: MEDICAL CENTER | Age: 23
End: 2023-05-12
Attending: EMERGENCY MEDICINE
Payer: MEDICAID

## 2023-05-12 ENCOUNTER — APPOINTMENT (OUTPATIENT)
Dept: RADIOLOGY | Facility: MEDICAL CENTER | Age: 23
End: 2023-05-12
Attending: EMERGENCY MEDICINE
Payer: MEDICAID

## 2023-05-12 VITALS
SYSTOLIC BLOOD PRESSURE: 118 MMHG | HEIGHT: 73 IN | TEMPERATURE: 98.9 F | DIASTOLIC BLOOD PRESSURE: 73 MMHG | HEART RATE: 85 BPM | OXYGEN SATURATION: 95 % | BODY MASS INDEX: 21.2 KG/M2 | RESPIRATION RATE: 14 BRPM | WEIGHT: 160 LBS

## 2023-05-12 DIAGNOSIS — S86.912A KNEE STRAIN, LEFT, INITIAL ENCOUNTER: ICD-10-CM

## 2023-05-12 DIAGNOSIS — S70.11XA CONTUSION OF RIGHT THIGH, INITIAL ENCOUNTER: ICD-10-CM

## 2023-05-12 DIAGNOSIS — S00.83XA CONTUSION OF FACE, INITIAL ENCOUNTER: ICD-10-CM

## 2023-05-12 DIAGNOSIS — S01.111A RIGHT EYELID LACERATION, INITIAL ENCOUNTER: ICD-10-CM
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
## 2023-05-12 LAB
ABO GROUP BLD: NORMAL
ALBUMIN SERPL BCP-MCNC: 4.5 G/DL (ref 3.2–4.9)
ALBUMIN/GLOB SERPL: 1.9 G/DL
ALP SERPL-CCNC: 54 U/L (ref 30–99)
ALT SERPL-CCNC: 16 U/L (ref 2–50)
ANION GAP SERPL CALC-SCNC: 15 MMOL/L (ref 7–16)
APTT PPP: 25.4 SEC (ref 24.7–36)
AST SERPL-CCNC: 25 U/L (ref 12–45)
BILIRUB SERPL-MCNC: 0.3 MG/DL (ref 0.1–1.5)
BLD GP AB SCN SERPL QL: NORMAL
BUN SERPL-MCNC: 13 MG/DL (ref 8–22)
CALCIUM ALBUM COR SERPL-MCNC: 9.1 MG/DL (ref 8.5–10.5)
CALCIUM SERPL-MCNC: 9.5 MG/DL (ref 8.5–10.5)
CHLORIDE SERPL-SCNC: 106 MMOL/L (ref 96–112)
CO2 SERPL-SCNC: 20 MMOL/L (ref 20–33)
CREAT SERPL-MCNC: 1.03 MG/DL (ref 0.5–1.4)
ERYTHROCYTE [DISTWIDTH] IN BLOOD BY AUTOMATED COUNT: 40.4 FL (ref 35.9–50)
ETHANOL BLD-MCNC: <10.1 MG/DL
GFR SERPLBLD CREATININE-BSD FMLA CKD-EPI: 104 ML/MIN/1.73 M 2
GLOBULIN SER CALC-MCNC: 2.4 G/DL (ref 1.9–3.5)
GLUCOSE SERPL-MCNC: 97 MG/DL (ref 65–99)
HCT VFR BLD AUTO: 43.4 % (ref 42–52)
HGB BLD-MCNC: 14.9 G/DL (ref 14–18)
INR PPP: 1.02 (ref 0.87–1.13)
MCH RBC QN AUTO: 31.6 PG (ref 27–33)
MCHC RBC AUTO-ENTMCNC: 34.3 G/DL (ref 33.7–35.3)
MCV RBC AUTO: 91.9 FL (ref 81.4–97.8)
PLATELET # BLD AUTO: 227 K/UL (ref 164–446)
PMV BLD AUTO: 9.6 FL (ref 9–12.9)
POTASSIUM SERPL-SCNC: 3.7 MMOL/L (ref 3.6–5.5)
PROT SERPL-MCNC: 6.9 G/DL (ref 6–8.2)
PROTHROMBIN TIME: 13.3 SEC (ref 12–14.6)
RBC # BLD AUTO: 4.72 M/UL (ref 4.7–6.1)
RH BLD: NORMAL
SODIUM SERPL-SCNC: 141 MMOL/L (ref 135–145)
WBC # BLD AUTO: 7.4 K/UL (ref 4.8–10.8)

## 2023-05-12 PROCEDURE — 85730 THROMBOPLASTIN TIME PARTIAL: CPT

## 2023-05-12 PROCEDURE — 73560 X-RAY EXAM OF KNEE 1 OR 2: CPT | Mod: LT

## 2023-05-12 PROCEDURE — 70450 CT HEAD/BRAIN W/O DYE: CPT

## 2023-05-12 PROCEDURE — 304999 HCHG REPAIR-SIMPLE/INTERMED LEVEL 1

## 2023-05-12 PROCEDURE — 71045 X-RAY EXAM CHEST 1 VIEW: CPT

## 2023-05-12 PROCEDURE — 303747 HCHG EXTRA SUTURE

## 2023-05-12 PROCEDURE — 70486 CT MAXILLOFACIAL W/O DYE: CPT

## 2023-05-12 PROCEDURE — 86900 BLOOD TYPING SEROLOGIC ABO: CPT

## 2023-05-12 PROCEDURE — 72125 CT NECK SPINE W/O DYE: CPT

## 2023-05-12 PROCEDURE — 72170 X-RAY EXAM OF PELVIS: CPT

## 2023-05-12 PROCEDURE — 86901 BLOOD TYPING SEROLOGIC RH(D): CPT

## 2023-05-12 PROCEDURE — 73551 X-RAY EXAM OF FEMUR 1: CPT | Mod: RT

## 2023-05-12 PROCEDURE — 304217 HCHG IRRIGATION SYSTEM

## 2023-05-12 PROCEDURE — 82077 ASSAY SPEC XCP UR&BREATH IA: CPT

## 2023-05-12 PROCEDURE — 86850 RBC ANTIBODY SCREEN: CPT

## 2023-05-12 PROCEDURE — 36415 COLL VENOUS BLD VENIPUNCTURE: CPT

## 2023-05-12 PROCEDURE — 80053 COMPREHEN METABOLIC PANEL: CPT

## 2023-05-12 PROCEDURE — 85610 PROTHROMBIN TIME: CPT

## 2023-05-12 PROCEDURE — 305948 HCHG GREEN TRAUMA ACT PRE-NOTIFY NO CC

## 2023-05-12 PROCEDURE — 700101 HCHG RX REV CODE 250: Performed by: EMERGENCY MEDICINE

## 2023-05-12 PROCEDURE — 85027 COMPLETE CBC AUTOMATED: CPT

## 2023-05-12 PROCEDURE — 99285 EMERGENCY DEPT VISIT HI MDM: CPT

## 2023-05-12 RX ADMIN — Medication 3 ML: at 22:49
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
## 2023-05-13 NOTE — ED NOTES
Patient ambulated independently to the bathroom with slow, slightly limping but steady gait accompanied by ED tech.

## 2023-05-13 NOTE — ED NOTES
Report from TITO Barreto  Patient from CT to room resting comfortably with C Collar in place, resp even and unlabored, NAD.
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
## 2023-05-13 NOTE — ED NOTES
PIV removed, catheter intact. Discharge education provided. Discharge paperwork signed by pt. All questions answered. All belongings with pt. Pt ambulated to lobby unassisted with steady gait.

## 2023-05-16 ENCOUNTER — OFFICE VISIT (OUTPATIENT)
Dept: INTERNAL MEDICINE | Facility: OTHER | Age: 23
End: 2023-05-16
Payer: MEDICAID

## 2023-05-16 VITALS
SYSTOLIC BLOOD PRESSURE: 126 MMHG | HEIGHT: 68 IN | DIASTOLIC BLOOD PRESSURE: 82 MMHG | TEMPERATURE: 97.7 F | WEIGHT: 153.8 LBS | OXYGEN SATURATION: 95 % | HEART RATE: 73 BPM | BODY MASS INDEX: 23.31 KG/M2

## 2023-05-16 DIAGNOSIS — E03.9 HYPOTHYROIDISM (ACQUIRED): ICD-10-CM

## 2023-05-16 DIAGNOSIS — G40.319 INTRACTABLE GENERALIZED IDIOPATHIC EPILEPSY WITHOUT STATUS EPILEPTICUS (HCC): ICD-10-CM

## 2023-05-16 DIAGNOSIS — M25.562 ACUTE PAIN OF LEFT KNEE: ICD-10-CM

## 2023-05-16 DIAGNOSIS — S06.0X1A CONCUSSION WITH BRIEF LOC: ICD-10-CM

## 2023-05-16 DIAGNOSIS — S01.112D LEFT EYELID LACERATION, SUBSEQUENT ENCOUNTER: ICD-10-CM

## 2023-05-16 PROBLEM — S01.119A EYELID LACERATION: Status: ACTIVE | Noted: 2023-05-16
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
## 2023-05-16 PROCEDURE — 3074F SYST BP LT 130 MM HG: CPT

## 2023-05-16 PROCEDURE — 99203 OFFICE O/P NEW LOW 30 MIN: CPT | Mod: GC

## 2023-05-16 PROCEDURE — 3079F DIAST BP 80-89 MM HG: CPT

## 2023-05-16 ASSESSMENT — ENCOUNTER SYMPTOMS
ABDOMINAL PAIN: 0
LOSS OF CONSCIOUSNESS: 0
SPUTUM PRODUCTION: 0
WEAKNESS: 0
SEIZURES: 0
NAUSEA: 0
DEPRESSION: 1
VOMITING: 0
PALPITATIONS: 0
SORE THROAT: 0
EYE REDNESS: 0
WEIGHT LOSS: 0
BLURRED VISION: 0
DIARRHEA: 0
HALLUCINATIONS: 0
DOUBLE VISION: 0
EYE PAIN: 0
DIZZINESS: 0
CONSTIPATION: 0
SENSORY CHANGE: 0
COUGH: 0
NERVOUS/ANXIOUS: 1
FEVER: 0
CHILLS: 0
HEADACHES: 0
SPEECH CHANGE: 0
SHORTNESS OF BREATH: 0
BACK PAIN: 0
INSOMNIA: 1
MYALGIAS: 1
HEARTBURN: 0

## 2023-05-16 ASSESSMENT — FIBROSIS 4 INDEX: FIB4 SCORE: 0.63

## 2023-05-16 ASSESSMENT — LIFESTYLE VARIABLES: SUBSTANCE_ABUSE: 0

## 2023-05-16 NOTE — ASSESSMENT & PLAN NOTE
Prior diagnosis.   Patient is compliant with medications.   Last episode > 1 year ago.   Follow-up with Neurology (Dr. Fragoso) scheduled for June 9, 2023.

## 2023-05-16 NOTE — ASSESSMENT & PLAN NOTE
Prior diagnosis.   Last TSH 06/2022: 1.90  Treatment: Levothyroxine 88 mcg daily (compliant).   Will address in following appointment.
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
## 2023-05-16 NOTE — PATIENT INSTRUCTIONS
Follow-up in ED for suture removal on 05/19.   Use Diclofenac Gel for left knee pain up to 4 times a day if needed.   Use Tylenol 1 gr every 8 hours for 7 days for pain management.   Avoid riding BMX, skating, contact sports.   Review Anxiety and stress resources handout.   Follow-up with Neurology as scheduled.   Follow-up in 6 weeks.
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
## 2023-05-19 DIAGNOSIS — G40.409 EPILEPSY SYMPTOMATIC, GENERALIZED (HCC): ICD-10-CM

## 2023-05-19 RX ORDER — DIVALPROEX SODIUM 500 MG/1
500 TABLET, EXTENDED RELEASE ORAL 2 TIMES DAILY
Qty: 60 TABLET | Refills: 0 | Status: SHIPPED | OUTPATIENT
Start: 2023-05-19 | End: 2023-06-19 | Stop reason: SDUPTHER

## 2023-05-19 RX ORDER — LEVOTHYROXINE SODIUM 88 UG/1
88 TABLET ORAL
Qty: 90 TABLET | Refills: 1 | Status: SHIPPED | OUTPATIENT
Start: 2023-05-19 | End: 2023-06-19 | Stop reason: SDUPTHER

## 2023-05-19 RX ORDER — LEVETIRACETAM 500 MG/1
500 TABLET ORAL 2 TIMES DAILY
Qty: 30 TABLET | Refills: 1 | Status: SHIPPED | OUTPATIENT
Start: 2023-05-19 | End: 2023-06-19 | Stop reason: SDUPTHER

## 2023-05-19 RX ORDER — LAMOTRIGINE 200 MG/1
200 TABLET ORAL DAILY
Qty: 30 TABLET | Refills: 0 | Status: SHIPPED | OUTPATIENT
Start: 2023-05-19 | End: 2023-06-19 | Stop reason: SDUPTHER

## 2023-05-19 RX ORDER — FOLIC ACID 1 MG/1
1 TABLET ORAL DAILY
Qty: 90 TABLET | Refills: 1 | Status: SHIPPED | OUTPATIENT
Start: 2023-05-19 | End: 2023-06-19 | Stop reason: SDUPTHER

## 2023-05-19 NOTE — TELEPHONE ENCOUNTER
All prescription refilled by provider. Patient inform per Dr. Nuñez, he will need to see Neurologist for follow up to continue his seizure medications. Patient currently schedule at the beginning on June, encourage him to keep appointment.

## 2023-05-19 NOTE — TELEPHONE ENCOUNTER
Patient's mother called because she said Dr. Nuñez hasn't filled a prescription for her son's Epilepsy.     I don't see a previous encounter, but she said she has been waiting on this prescription and didn't want to leave another message.     She is scared he will have a seizure and urgently needs this prescription filled before the weekend.

## 2023-05-19 NOTE — TELEPHONE ENCOUNTER
Patient came into the office.He needs a refill of all 5 of his medication. He doesn't want to go through the weekend without them and have a seizure.

## 2023-05-22 ENCOUNTER — HOSPITAL ENCOUNTER (EMERGENCY)
Facility: MEDICAL CENTER | Age: 23
End: 2023-05-22
Payer: MEDICAID

## 2023-05-22 VITALS
SYSTOLIC BLOOD PRESSURE: 119 MMHG | BODY MASS INDEX: 25.79 KG/M2 | HEIGHT: 65 IN | DIASTOLIC BLOOD PRESSURE: 65 MMHG | RESPIRATION RATE: 12 BRPM | HEART RATE: 69 BPM | TEMPERATURE: 96.7 F | OXYGEN SATURATION: 100 % | WEIGHT: 154.76 LBS

## 2023-05-22 PROCEDURE — 15853 REMOVAL SUTR/STAPL XREQ ANES: CPT

## 2023-05-22 PROCEDURE — 99281 EMR DPT VST MAYX REQ PHY/QHP: CPT | Mod: EDC

## 2023-05-22 ASSESSMENT — FIBROSIS 4 INDEX: FIB4 SCORE: 0.63

## 2023-05-22 NOTE — ED TRIAGE NOTES
Pt returned for suture removal above left eye.  Pt was seen by [facility] on 5/16 without removal.  Site CDI, well approximated, no redness or swelling.  3 sutures removed.  Pt dismissed from triage.

## 2023-06-12 DIAGNOSIS — G40.409 EPILEPSY SYMPTOMATIC, GENERALIZED (HCC): ICD-10-CM

## 2023-06-12 NOTE — TELEPHONE ENCOUNTER
Received request via: Pharmacy    Was the patient seen in the last year in this department? Yes    Does the patient have an active prescription (recently filled or refills available) for medication(s) requested? No. Last fill was 05/19/23 for 30 days, no refill     Does the patient have senior living Plus and need 100 day supply (blood pressure, diabetes and cholesterol meds only)? Patient does not have SCP

## 2023-06-12 NOTE — TELEPHONE ENCOUNTER
Patient came into the office and states he needs all 5 of his current medications refilled for his seizures because he cant get into he neurologist until July 13th.  He is asking for a month supply of each medication.  He uses the [facility] pharmacy on [facility] [address] [facility].

## 2023-06-20 RX ORDER — DIVALPROEX SODIUM 500 MG/1
500 TABLET, EXTENDED RELEASE ORAL 2 TIMES DAILY
Qty: 60 TABLET | Refills: 0 | Status: SHIPPED | OUTPATIENT
Start: 2023-06-20 | End: 2023-07-24 | Stop reason: SDUPTHER

## 2023-06-20 RX ORDER — LEVOTHYROXINE SODIUM 88 UG/1
88 TABLET ORAL
Qty: 90 TABLET | Refills: 1 | Status: SHIPPED | OUTPATIENT
Start: 2023-06-20 | End: 2023-09-18 | Stop reason: SDUPTHER

## 2023-06-20 RX ORDER — FOLIC ACID 1 MG/1
1 TABLET ORAL DAILY
Qty: 90 TABLET | Refills: 1 | Status: SHIPPED | OUTPATIENT
Start: 2023-06-20 | End: 2023-12-06 | Stop reason: SDUPTHER

## 2023-06-20 RX ORDER — LEVETIRACETAM 500 MG/1
500 TABLET ORAL 2 TIMES DAILY
Qty: 30 TABLET | Refills: 1 | Status: SHIPPED | OUTPATIENT
Start: 2023-06-20 | End: 2023-08-18 | Stop reason: SDUPTHER

## 2023-06-20 RX ORDER — LAMOTRIGINE 200 MG/1
200 TABLET ORAL DAILY
Qty: 30 TABLET | Refills: 0 | Status: SHIPPED | OUTPATIENT
Start: 2023-06-20 | End: 2023-07-12 | Stop reason: SDUPTHER

## 2023-06-23 ENCOUNTER — OFFICE VISIT (OUTPATIENT)
Dept: INTERNAL MEDICINE | Facility: OTHER | Age: 23
End: 2023-06-23
Payer: MEDICAID

## 2023-06-23 VITALS
SYSTOLIC BLOOD PRESSURE: 129 MMHG | WEIGHT: 152 LBS | DIASTOLIC BLOOD PRESSURE: 86 MMHG | HEART RATE: 74 BPM | BODY MASS INDEX: 23.04 KG/M2 | OXYGEN SATURATION: 98 % | TEMPERATURE: 98.9 F | HEIGHT: 68 IN

## 2023-06-23 DIAGNOSIS — E53.8 FOLIC ACID DEFICIENCY: ICD-10-CM

## 2023-06-23 DIAGNOSIS — G40.909 SEIZURE DISORDER (HCC): ICD-10-CM

## 2023-06-23 DIAGNOSIS — E03.9 HYPOTHYROIDISM (ACQUIRED): ICD-10-CM

## 2023-06-23 DIAGNOSIS — S06.0X1A CONCUSSION WITH BRIEF LOC: ICD-10-CM

## 2023-06-23 PROBLEM — F39 MOOD DISORDER (HCC): Status: ACTIVE | Noted: 2022-08-25

## 2023-06-23 PROCEDURE — 3079F DIAST BP 80-89 MM HG: CPT

## 2023-06-23 PROCEDURE — 99213 OFFICE O/P EST LOW 20 MIN: CPT | Mod: GE

## 2023-06-23 PROCEDURE — 3074F SYST BP LT 130 MM HG: CPT

## 2023-06-23 ASSESSMENT — ENCOUNTER SYMPTOMS
MYALGIAS: 0
SENSORY CHANGE: 0
HEADACHES: 0
SEIZURES: 0
EYE REDNESS: 0
INSOMNIA: 0
DOUBLE VISION: 0
SPUTUM PRODUCTION: 0
DEPRESSION: 0
NAUSEA: 0
EYE DISCHARGE: 0
SHORTNESS OF BREATH: 0
VOMITING: 0
NERVOUS/ANXIOUS: 0
HEARTBURN: 0
WEIGHT LOSS: 0
TINGLING: 0
CONSTIPATION: 0
SPEECH CHANGE: 0
DIARRHEA: 0
EYE PAIN: 0
FOCAL WEAKNESS: 0
TREMORS: 0
ABDOMINAL PAIN: 0
BLURRED VISION: 0
DIZZINESS: 0
CHILLS: 0
FEVER: 0
COUGH: 0
SORE THROAT: 0
LOSS OF CONSCIOUSNESS: 0
CLAUDICATION: 0
BACK PAIN: 0
PALPITATIONS: 0

## 2023-06-23 ASSESSMENT — FIBROSIS 4 INDEX: FIB4 SCORE: 0.63

## 2023-06-23 ASSESSMENT — PATIENT HEALTH QUESTIONNAIRE - PHQ9: CLINICAL INTERPRETATION OF PHQ2 SCORE: 0

## 2023-06-23 ASSESSMENT — LIFESTYLE VARIABLES: SUBSTANCE_ABUSE: 0

## 2023-06-23 NOTE — ASSESSMENT & PLAN NOTE
Last TSH 06/2022: 1.90  Treatment: Levothyroxine 88 mcg daily.  Ordered TSH level prior to next appointment in 6 months.

## 2023-06-23 NOTE — PATIENT INSTRUCTIONS
Continue use of helmet while skateboarding, biking or other outdoor activities.   Continue seizure control medications.  Follow-up with Neurology as scheduled.   Follow-up in 6 months or sooner if needed.

## 2023-06-23 NOTE — ASSESSMENT & PLAN NOTE
"Follow-up appointment d/t Concussion with LOC on 05/12/23 while skateboarding.   Prior appointment 05/16/23 recommended patient to hold on skateboarding for at least 2 weeks and helmet use.   On this appointment patient reports he has been back to skateboarding and is using his helmet. Notes multiple fall with \"minor bumps\" to his head. No loss of consciousness, no seizures, no nausea/vomiting, no visual changes.   Recommend continues use of helmet and to carefully perform this type of outdoor activities.   "

## 2023-06-23 NOTE — PROGRESS NOTES
"OFFICE VISIT    Sang Magaña is a 22 y.o. male who presents today with the following:    Reason for visit:  Follow-up after concussion.     HPI:  Mr. Magaña is a 22-year-old male with past medical history of Epilepsy, Hypothyroidism. Presents for follow-up. Discussed and assessed the following:     - Concussion with LOC follow-up. Secondary to fall while skateboarding. Was recommended to progressively return to skating and use helmet. Has been skateboarding before recommended; has had multiple falls with \"minor bumps\" to his head w/o loss of consciousness, seizures, nausea, vomiting, vision loss, confusion or others. Recommended continuous helmet use.   - Seizure disorder. No seizures since last appointment. Have refilled medications. Has appointment with Neurology on 07/12/23.   - Ordered CBC, Folate, B12, TSH to follow-up on Hypothyroidism and Folic acid deficiency history.     Review of Systems   Constitutional:  Negative for chills, fever and weight loss.   HENT:  Negative for congestion, ear discharge, hearing loss, nosebleeds, sore throat and tinnitus.    Eyes:  Negative for blurred vision, double vision, pain, discharge and redness.   Respiratory:  Negative for cough, sputum production and shortness of breath.    Cardiovascular:  Negative for chest pain, palpitations, claudication and leg swelling.   Gastrointestinal:  Negative for abdominal pain, constipation, diarrhea, heartburn, nausea and vomiting.   Genitourinary:  Negative for dysuria, frequency and hematuria.   Musculoskeletal:  Negative for back pain, joint pain and myalgias.   Skin:  Negative for rash.   Neurological:  Negative for dizziness, tingling, tremors, sensory change, speech change, focal weakness, seizures, loss of consciousness and headaches.   Endo/Heme/Allergies:  Negative for environmental allergies.   Psychiatric/Behavioral:  Negative for depression and substance abuse. The patient is not nervous/anxious and does not have insomnia. " "       /86 (BP Location: Right arm, Patient Position: Sitting, BP Cuff Size: Adult)   Pulse 74   Temp 37.2 °C (98.9 °F) (Temporal)   Ht 1.727 m (5' 8\")   Wt 68.9 kg (152 lb)   SpO2 98%   BMI 23.11 kg/m²     Physical Exam  Constitutional:       General: He is not in acute distress.     Appearance: He is not ill-appearing or toxic-appearing.   HENT:      Head: Normocephalic and atraumatic.      Nose: Nose normal. No congestion.      Mouth/Throat:      Mouth: Mucous membranes are moist.      Pharynx: No oropharyngeal exudate or posterior oropharyngeal erythema.   Eyes:      General: No scleral icterus.     Extraocular Movements: Extraocular movements intact.      Pupils: Pupils are equal, round, and reactive to light.   Cardiovascular:      Rate and Rhythm: Normal rate and regular rhythm.      Pulses: Normal pulses.      Heart sounds: Normal heart sounds.   Pulmonary:      Effort: Pulmonary effort is normal. No respiratory distress.      Breath sounds: Normal breath sounds.   Chest:      Chest wall: No tenderness.   Abdominal:      General: Bowel sounds are normal. There is no distension.      Palpations: Abdomen is soft.      Tenderness: There is no abdominal tenderness. There is no right CVA tenderness or left CVA tenderness.   Musculoskeletal:         General: No swelling or tenderness. Normal range of motion.      Cervical back: Normal range of motion. No rigidity.      Right lower leg: No edema.      Left lower leg: No edema.   Lymphadenopathy:      Cervical: No cervical adenopathy.   Skin:     General: Skin is warm.      Capillary Refill: Capillary refill takes less than 2 seconds.      Coloration: Skin is not jaundiced or pale.      Findings: No lesion or rash.   Neurological:      General: No focal deficit present.      Mental Status: He is alert and oriented to person, place, and time.      Cranial Nerves: No cranial nerve deficit.      Sensory: No sensory deficit.      Motor: No weakness.      " "Coordination: Coordination normal.      Gait: Gait normal.      Deep Tendon Reflexes: Reflexes normal.       Assessment and Plan:     23 yo male with past medical history of Epilepsy, Hypothyroidism. Follow-up d/t recent concussion with LOC.     Concussion with brief LOC  Follow-up appointment d/t Concussion with LOC on 05/12/23 while skateboarding.   Prior appointment 05/16/23 recommended patient to hold on skateboarding for at least 2 weeks and helmet use.   On this appointment patient reports he has been back to skateboarding and is using his helmet. Notes multiple fall with \"minor bumps\" to his head. No loss of consciousness, no seizures, no nausea/vomiting, no visual changes.   Recommend continues use of helmet and to carefully perform this type of outdoor activities.     Folic acid deficiency  Requested CBC, Folic acid, and B12 levels prior to follow-up in 6 months.     Hypothyroidism (acquired)  Last TSH 06/2022: 1.90  Treatment: Levothyroxine 88 mcg daily.  Ordered TSH level prior to next appointment in 6 months.     Orders Placed This Encounter    TSH+FREE T4    VIT B12,  FOLIC ACID    CBC WITH DIFFERENTIAL    Comp Metabolic Panel       Return in about 6 months (around 12/23/2023).      Patient case was seen/ assessed/ discussed with Dr. Chen    Signed by:    NIKOS Buitrago, Internal Medicine  [facility]    "

## 2023-07-24 DIAGNOSIS — G40.409 EPILEPSY SYMPTOMATIC, GENERALIZED (HCC): ICD-10-CM

## 2023-07-24 RX ORDER — DIVALPROEX SODIUM 500 MG/1
500 TABLET, EXTENDED RELEASE ORAL 2 TIMES DAILY
Qty: 60 TABLET | Refills: 0 | Status: SHIPPED | OUTPATIENT
Start: 2023-07-24 | End: 2024-02-09 | Stop reason: SDUPTHER

## 2023-07-24 RX ORDER — LAMOTRIGINE 200 MG/1
200 TABLET ORAL DAILY
Qty: 30 TABLET | Refills: 0 | Status: SHIPPED | OUTPATIENT
Start: 2023-07-24 | End: 2023-08-18 | Stop reason: SDUPTHER

## 2023-08-18 NOTE — TELEPHONE ENCOUNTER
Received request via: Pharmacy    Was the patient seen in the last year in this department? Yes    Does the patient have an active prescription (recently filled or refills available) for medication(s) requested?  yes    Does the patient have [facility] and need 100 day supply (blood pressure, diabetes and cholesterol meds only)? Patient does not have SCP

## 2023-08-21 NOTE — TELEPHONE ENCOUNTER
I called patient not set up to receive messages. Please produce letter and I will mail to patient.

## 2023-08-22 RX ORDER — LEVETIRACETAM 500 MG/1
500 TABLET ORAL 2 TIMES DAILY
Qty: 30 TABLET | Refills: 0 | Status: SHIPPED | OUTPATIENT
Start: 2023-08-22 | End: 2024-02-09 | Stop reason: SDUPTHER

## 2023-08-22 RX ORDER — LAMOTRIGINE 200 MG/1
200 TABLET ORAL DAILY
Qty: 30 TABLET | Refills: 0 | Status: SHIPPED | OUTPATIENT
Start: 2023-08-22 | End: 2024-02-09 | Stop reason: SDUPTHER

## 2023-08-24 ENCOUNTER — HOSPITAL ENCOUNTER (OUTPATIENT)
Dept: LAB | Facility: MEDICAL CENTER | Age: 23
End: 2023-08-24
Attending: PSYCHIATRY & NEUROLOGY
Payer: MEDICAID

## 2023-08-24 ENCOUNTER — HOSPITAL ENCOUNTER (OUTPATIENT)
Dept: LAB | Facility: MEDICAL CENTER | Age: 23
End: 2023-08-24
Payer: MEDICAID

## 2023-08-24 DIAGNOSIS — G40.909 SEIZURE DISORDER (HCC): ICD-10-CM

## 2023-08-24 DIAGNOSIS — E53.8 FOLIC ACID DEFICIENCY: ICD-10-CM

## 2023-08-24 LAB
25(OH)D3 SERPL-MCNC: 35 NG/ML (ref 30–100)
ALBUMIN SERPL BCP-MCNC: 4.7 G/DL (ref 3.2–4.9)
ALBUMIN SERPL BCP-MCNC: 4.7 G/DL (ref 3.2–4.9)
ALBUMIN/GLOB SERPL: 2 G/DL
ALBUMIN/GLOB SERPL: 2 G/DL
ALP SERPL-CCNC: 49 U/L (ref 30–99)
ALP SERPL-CCNC: 49 U/L (ref 30–99)
ALT SERPL-CCNC: 15 U/L (ref 2–50)
ALT SERPL-CCNC: 16 U/L (ref 2–50)
ANION GAP SERPL CALC-SCNC: 12 MMOL/L (ref 7–16)
ANION GAP SERPL CALC-SCNC: 13 MMOL/L (ref 7–16)
AST SERPL-CCNC: 20 U/L (ref 12–45)
AST SERPL-CCNC: 20 U/L (ref 12–45)
BILIRUB SERPL-MCNC: 0.5 MG/DL (ref 0.1–1.5)
BILIRUB SERPL-MCNC: 0.5 MG/DL (ref 0.1–1.5)
BUN SERPL-MCNC: 13 MG/DL (ref 8–22)
BUN SERPL-MCNC: 13 MG/DL (ref 8–22)
CALCIUM ALBUM COR SERPL-MCNC: 9.1 MG/DL (ref 8.5–10.5)
CALCIUM ALBUM COR SERPL-MCNC: 9.2 MG/DL (ref 8.5–10.5)
CALCIUM SERPL-MCNC: 9.7 MG/DL (ref 8.5–10.5)
CALCIUM SERPL-MCNC: 9.8 MG/DL (ref 8.5–10.5)
CHLORIDE SERPL-SCNC: 106 MMOL/L (ref 96–112)
CHLORIDE SERPL-SCNC: 106 MMOL/L (ref 96–112)
CO2 SERPL-SCNC: 23 MMOL/L (ref 20–33)
CO2 SERPL-SCNC: 23 MMOL/L (ref 20–33)
CREAT SERPL-MCNC: 0.82 MG/DL (ref 0.5–1.4)
CREAT SERPL-MCNC: 0.83 MG/DL (ref 0.5–1.4)
GFR SERPLBLD CREATININE-BSD FMLA CKD-EPI: 125 ML/MIN/1.73 M 2
GFR SERPLBLD CREATININE-BSD FMLA CKD-EPI: 126 ML/MIN/1.73 M 2
GLOBULIN SER CALC-MCNC: 2.4 G/DL (ref 1.9–3.5)
GLOBULIN SER CALC-MCNC: 2.4 G/DL (ref 1.9–3.5)
GLUCOSE SERPL-MCNC: 89 MG/DL (ref 65–99)
GLUCOSE SERPL-MCNC: 90 MG/DL (ref 65–99)
POTASSIUM SERPL-SCNC: 3.9 MMOL/L (ref 3.6–5.5)
POTASSIUM SERPL-SCNC: 4 MMOL/L (ref 3.6–5.5)
PROT SERPL-MCNC: 7.1 G/DL (ref 6–8.2)
PROT SERPL-MCNC: 7.1 G/DL (ref 6–8.2)
SODIUM SERPL-SCNC: 141 MMOL/L (ref 135–145)
SODIUM SERPL-SCNC: 142 MMOL/L (ref 135–145)
T4 FREE SERPL-MCNC: 1.76 NG/DL (ref 0.93–1.7)
TSH SERPL DL<=0.005 MIU/L-ACNC: 0.85 UIU/ML (ref 0.38–5.33)
VALPROATE SERPL-MCNC: 84.4 UG/ML (ref 50–100)
VIT B12 SERPL-MCNC: 930 PG/ML (ref 211–911)
VIT B12 SERPL-MCNC: 939 PG/ML (ref 211–911)

## 2023-08-24 PROCEDURE — 82607 VITAMIN B-12: CPT

## 2023-08-24 PROCEDURE — 36415 COLL VENOUS BLD VENIPUNCTURE: CPT

## 2023-08-24 PROCEDURE — 82306 VITAMIN D 25 HYDROXY: CPT

## 2023-08-24 PROCEDURE — 80053 COMPREHEN METABOLIC PANEL: CPT | Mod: 91

## 2023-08-24 PROCEDURE — 85025 COMPLETE CBC W/AUTO DIFF WBC: CPT

## 2023-08-24 PROCEDURE — 84439 ASSAY OF FREE THYROXINE: CPT

## 2023-08-24 PROCEDURE — 80175 DRUG SCREEN QUAN LAMOTRIGINE: CPT

## 2023-08-24 PROCEDURE — 80164 ASSAY DIPROPYLACETIC ACD TOT: CPT

## 2023-08-24 PROCEDURE — 85025 COMPLETE CBC W/AUTO DIFF WBC: CPT | Mod: 91

## 2023-08-24 PROCEDURE — 82746 ASSAY OF FOLIC ACID SERUM: CPT

## 2023-08-24 PROCEDURE — 84443 ASSAY THYROID STIM HORMONE: CPT

## 2023-08-24 PROCEDURE — 80053 COMPREHEN METABOLIC PANEL: CPT

## 2023-08-24 PROCEDURE — 82607 VITAMIN B-12: CPT | Mod: 91

## 2023-08-25 LAB
BASOPHILS # BLD AUTO: 0.7 % (ref 0–1.8)
BASOPHILS # BLD AUTO: 0.9 % (ref 0–1.8)
BASOPHILS # BLD: 0.03 K/UL (ref 0–0.12)
BASOPHILS # BLD: 0.04 K/UL (ref 0–0.12)
EOSINOPHIL # BLD AUTO: 0.07 K/UL (ref 0–0.51)
EOSINOPHIL # BLD AUTO: 0.09 K/UL (ref 0–0.51)
EOSINOPHIL NFR BLD: 1.5 % (ref 0–6.9)
EOSINOPHIL NFR BLD: 2 % (ref 0–6.9)
ERYTHROCYTE [DISTWIDTH] IN BLOOD BY AUTOMATED COUNT: 39.8 FL (ref 35.9–50)
ERYTHROCYTE [DISTWIDTH] IN BLOOD BY AUTOMATED COUNT: 39.8 FL (ref 35.9–50)
FOLATE SERPL-MCNC: >40 NG/ML
HCT VFR BLD AUTO: 46.5 % (ref 42–52)
HCT VFR BLD AUTO: 46.5 % (ref 42–52)
HGB BLD-MCNC: 15.7 G/DL (ref 14–18)
HGB BLD-MCNC: 15.8 G/DL (ref 14–18)
IMM GRANULOCYTES # BLD AUTO: 0.01 K/UL (ref 0–0.11)
IMM GRANULOCYTES # BLD AUTO: 0.01 K/UL (ref 0–0.11)
IMM GRANULOCYTES NFR BLD AUTO: 0.2 % (ref 0–0.9)
IMM GRANULOCYTES NFR BLD AUTO: 0.2 % (ref 0–0.9)
LYMPHOCYTES # BLD AUTO: 2.01 K/UL (ref 1–4.8)
LYMPHOCYTES # BLD AUTO: 2.04 K/UL (ref 1–4.8)
LYMPHOCYTES NFR BLD: 44.5 % (ref 22–41)
LYMPHOCYTES NFR BLD: 44.5 % (ref 22–41)
MCH RBC QN AUTO: 30.9 PG (ref 27–33)
MCH RBC QN AUTO: 31 PG (ref 27–33)
MCHC RBC AUTO-ENTMCNC: 33.8 G/DL (ref 32.3–36.5)
MCHC RBC AUTO-ENTMCNC: 34 G/DL (ref 32.3–36.5)
MCV RBC AUTO: 91.2 FL (ref 81.4–97.8)
MCV RBC AUTO: 91.5 FL (ref 81.4–97.8)
MONOCYTES # BLD AUTO: 0.43 K/UL (ref 0–0.85)
MONOCYTES # BLD AUTO: 0.43 K/UL (ref 0–0.85)
MONOCYTES NFR BLD AUTO: 9.4 % (ref 0–13.4)
MONOCYTES NFR BLD AUTO: 9.5 % (ref 0–13.4)
NEUTROPHILS # BLD AUTO: 1.97 K/UL (ref 1.82–7.42)
NEUTROPHILS # BLD AUTO: 1.97 K/UL (ref 1.82–7.42)
NEUTROPHILS NFR BLD: 43 % (ref 44–72)
NEUTROPHILS NFR BLD: 43.6 % (ref 44–72)
NRBC # BLD AUTO: 0 K/UL
NRBC # BLD AUTO: 0 K/UL
NRBC BLD-RTO: 0 /100 WBC (ref 0–0.2)
NRBC BLD-RTO: 0 /100 WBC (ref 0–0.2)
PLATELET # BLD AUTO: 263 K/UL (ref 164–446)
PLATELET # BLD AUTO: 269 K/UL (ref 164–446)
PMV BLD AUTO: 10.1 FL (ref 9–12.9)
PMV BLD AUTO: 9.8 FL (ref 9–12.9)
RBC # BLD AUTO: 5.08 M/UL (ref 4.7–6.1)
RBC # BLD AUTO: 5.1 M/UL (ref 4.7–6.1)
WBC # BLD AUTO: 4.5 K/UL (ref 4.8–10.8)
WBC # BLD AUTO: 4.6 K/UL (ref 4.8–10.8)

## 2023-08-26 LAB — LAMOTRIGINE SERPL-MCNC: 19.2 UG/ML (ref 3–15)

## 2023-09-18 ENCOUNTER — OFFICE VISIT (OUTPATIENT)
Dept: INTERNAL MEDICINE | Facility: OTHER | Age: 23
End: 2023-09-18
Payer: MEDICAID

## 2023-09-18 VITALS
SYSTOLIC BLOOD PRESSURE: 105 MMHG | TEMPERATURE: 97.9 F | DIASTOLIC BLOOD PRESSURE: 71 MMHG | HEART RATE: 66 BPM | BODY MASS INDEX: 24.25 KG/M2 | WEIGHT: 160 LBS | OXYGEN SATURATION: 97 % | HEIGHT: 68 IN

## 2023-09-18 DIAGNOSIS — L98.9 SKIN LESIONS: ICD-10-CM

## 2023-09-18 DIAGNOSIS — G40.909 SEIZURE DISORDER (HCC): ICD-10-CM

## 2023-09-18 DIAGNOSIS — G40.319 INTRACTABLE GENERALIZED IDIOPATHIC EPILEPSY WITHOUT STATUS EPILEPTICUS (HCC): ICD-10-CM

## 2023-09-18 DIAGNOSIS — E03.9 HYPOTHYROIDISM (ACQUIRED): ICD-10-CM

## 2023-09-18 PROCEDURE — 99214 OFFICE O/P EST MOD 30 MIN: CPT | Mod: GC

## 2023-09-18 PROCEDURE — 3078F DIAST BP <80 MM HG: CPT

## 2023-09-18 PROCEDURE — 3074F SYST BP LT 130 MM HG: CPT

## 2023-09-18 RX ORDER — LEVOTHYROXINE SODIUM 88 UG/1
88 TABLET ORAL
Qty: 90 TABLET | Refills: 1 | Status: SHIPPED | OUTPATIENT
Start: 2023-09-18 | End: 2023-12-06 | Stop reason: SDUPTHER

## 2023-09-18 ASSESSMENT — FIBROSIS 4 INDEX: FIB4 SCORE: 0.47

## 2023-09-18 NOTE — PATIENT INSTRUCTIONS
Please install My Chart in you phone.   Schedule with Dermatology appointment; you will be notified by My Chart if available or via physical mail.   Follow with Neurology as recommended.   Follow-up in 3 months for Annual Wellness Visit.

## 2023-09-19 ASSESSMENT — ENCOUNTER SYMPTOMS
PALPITATIONS: 0
TINGLING: 0
DIZZINESS: 0
SENSORY CHANGE: 0
TREMORS: 0
HEADACHES: 0
LOSS OF CONSCIOUSNESS: 0
FOCAL WEAKNESS: 0
SEIZURES: 0

## 2023-09-20 NOTE — ASSESSMENT & PLAN NOTE
Has followed with Neurologist: Dr. Fragoso.   No changes have been made in treatment.   No seizure episodes since last appointment.   Medications to be refilled and monitored by Neurology.   
Last TSH: 0.850 (08/24/23)  Compliant with medication.   Placed refill for Levothyroxine 88 mcg.   
Reports chronic/recurrent warts on upper and lower extremities since adolescence.   Has had multiple treatments with cryotherapy.   - Referral to Dermatology.   
individual instruction

## 2023-09-20 NOTE — PROGRESS NOTES
"OFFICE VISIT    Sang Magaña is a 23 y.o. male with past medical history of Epilepsy, Hypothyroidism.    Reason for visit:  Follow-up on lab results.     HPI:  Hypothyroidism. Last TSH: 0.850 (08/24/23). Plan to continue same dose of Levothyroxine (88 mcg daily) at this time.   Seizure disorder. Has seen his neurologist in past month. No changes have been made in management at this time. No seizure activities reported since last appointment. No new concussions secondary to sports.   Skin lesions. Multiple warts affecting upper and lower extremities. Per patient have been present since adolescence and has had prior cryotherapy but they still recur. Placed Dermatology referral.      Review of Systems   Cardiovascular:  Negative for palpitations.   Skin:         Multiple warts in upper and lower extremities   Neurological:  Negative for dizziness, tingling, tremors, sensory change, focal weakness, seizures, loss of consciousness and headaches.       /71 (BP Location: Right arm, Patient Position: Sitting, BP Cuff Size: Adult)   Pulse 66   Temp 36.6 °C (97.9 °F) (Temporal)   Ht 1.732 m (5' 8.2\")   Wt 72.6 kg (160 lb)   SpO2 97%   BMI 24.19 kg/m²     Physical Exam  Constitutional:       General: He is not in acute distress.     Appearance: He is normal weight. He is not ill-appearing.   Musculoskeletal:      Cervical back: No rigidity or tenderness.   Lymphadenopathy:      Cervical: No cervical adenopathy.   Skin:     Findings: Lesion (Multiple warts over hands and thighs.) present.   Neurological:      General: No focal deficit present.      Mental Status: He is alert and oriented to person, place, and time. Mental status is at baseline.      Cranial Nerves: No cranial nerve deficit.      Sensory: No sensory deficit.      Motor: No weakness.      Coordination: Coordination normal.      Gait: Gait normal.      Deep Tendon Reflexes: Reflexes normal.       Assessment and Plan:   24 yo male with past medical " history of Epilepsy, Hypothyroidism.    Idiopathic generalized epilepsy  Has followed with Neurologist: Dr. Fragoso.   No changes have been made in treatment.   No seizure episodes since last appointment.   Medications to be refilled and monitored by Neurology.     Skin lesions  Reports chronic/recurrent warts on upper and lower extremities since adolescence.   Has had multiple treatments with cryotherapy.   - Referral to Dermatology.     Hypothyroidism (acquired)  Last TSH: 0.850 (08/24/23)  Compliant with medication.   Placed refill for Levothyroxine 88 mcg.     Orders Placed This Encounter    Referral to Dermatology    levothyroxine (SYNTHROID) 88 MCG Tab       Return in about 3 months (around 12/18/2023) for Long.      Patient case was seen/ assessed/ discussed with Dr. Jaime.    Signed by:    Bernard Hoffmann, PGY-2, Internal Medicine  [facility]

## 2023-09-30 ENCOUNTER — APPOINTMENT (OUTPATIENT)
Dept: RADIOLOGY | Facility: MEDICAL CENTER | Age: 23
End: 2023-09-30
Attending: EMERGENCY MEDICINE
Payer: MEDICAID

## 2023-09-30 ENCOUNTER — HOSPITAL ENCOUNTER (EMERGENCY)
Facility: MEDICAL CENTER | Age: 23
End: 2023-09-30
Attending: EMERGENCY MEDICINE
Payer: MEDICAID

## 2023-09-30 VITALS
DIASTOLIC BLOOD PRESSURE: 67 MMHG | TEMPERATURE: 98.2 F | RESPIRATION RATE: 16 BRPM | HEART RATE: 59 BPM | HEIGHT: 68 IN | OXYGEN SATURATION: 96 % | BODY MASS INDEX: 24.25 KG/M2 | WEIGHT: 160 LBS | SYSTOLIC BLOOD PRESSURE: 116 MMHG

## 2023-09-30 DIAGNOSIS — S40.212A ABRASION OF LEFT SHOULDER, INITIAL ENCOUNTER: ICD-10-CM

## 2023-09-30 DIAGNOSIS — S09.90XA TRAUMATIC INJURY OF HEAD, INITIAL ENCOUNTER: ICD-10-CM

## 2023-09-30 DIAGNOSIS — S00.83XA CONTUSION OF FOREHEAD, INITIAL ENCOUNTER: Primary | ICD-10-CM

## 2023-09-30 DIAGNOSIS — G40.919 BREAKTHROUGH SEIZURE (HCC): ICD-10-CM

## 2023-09-30 DIAGNOSIS — G40.909 NONINTRACTABLE EPILEPSY WITHOUT STATUS EPILEPTICUS, UNSPECIFIED EPILEPSY TYPE (HCC): ICD-10-CM

## 2023-09-30 PROCEDURE — 73030 X-RAY EXAM OF SHOULDER: CPT | Mod: LT

## 2023-09-30 PROCEDURE — 99284 EMERGENCY DEPT VISIT MOD MDM: CPT

## 2023-09-30 PROCEDURE — 70450 CT HEAD/BRAIN W/O DYE: CPT

## 2023-09-30 RX ORDER — IBUPROFEN 800 MG/1
800 TABLET ORAL EVERY 8 HOURS PRN
Qty: 9 TABLET | Refills: 0 | Status: SHIPPED | OUTPATIENT
Start: 2023-09-30 | End: 2023-10-03

## 2023-09-30 RX ORDER — ACETAMINOPHEN 500 MG
1000 TABLET ORAL EVERY 6 HOURS PRN
Qty: 20 TABLET | Refills: 0 | Status: SHIPPED | OUTPATIENT
Start: 2023-09-30 | End: 2023-10-04

## 2023-09-30 ASSESSMENT — FIBROSIS 4 INDEX: FIB4 SCORE: 0.47

## 2023-10-01 NOTE — ED TRIAGE NOTES
Chief Complaint   Patient presents with    Seizure     Pt had seizure witnessed by girlfriend that lasted 2-3 min; pt has hx of epilepsy and is on 3 meds     Pt brought in from friend's house by EMS for above complaint. Pt's girlfriend at bedside. Pt did not have an aura before his seizure today so he did his the front and back of his head as well as his left shoulder when he fell during the seizure. Pt does endorse taking 1 shot of [place] and taking a hit on a marijuana vape pen prior to the seizure.     Pt endorses taking Depatoke, Keppra, and Lamictal daily for seizures. Pt denies missing any doses.

## 2023-10-01 NOTE — ED NOTES
Pt to CT scan via gurney and is now back in his room with girlfriend and caregiver at bedside. No acute distress noted.

## 2023-10-01 NOTE — ED PROVIDER NOTES
"ED Provider Note    Scribed for Kirill Roque by My Taylor. 9/30/2023  9:55 PM    Primary care provider: Bernard Nuñez M.D.  Means of arrival: EMS  History obtained from: Patient  History limited by: None    CHIEF COMPLAINT  Chief Complaint   Patient presents with    Seizure     Pt had grand mal seizure witnessed by girlfriend that lasted 2-3 min; pt has hx of epilepsy and is on 3 meds       EXTERNAL RECORDS REVIEWED  Outpatient Notes patient's his primary care physician on the 18th of this month for hypothyroidism follow-up, Seizure follow-up, was noted to have seen his neurologist in August, stable on medications including Keppra, Depakote, Lamictal    HPI/ROS  LIMITATION TO HISTORY   Select: : None  OUTSIDE HISTORIAN(S):  Host home provider at bedside.    HPI  Sang Magaña is a 23 y.o. male who has history of epilepsy, hypoactive thyroid, and seizure disorder presents to the Emergency Department via EMS for evaluation of seizure onset earlier today.  The patient notes that he was with his girlfriend and that he got second hand high. The patient's girlfriend witnessed the patient then have a seizure that lasted about 2 to 3 minutes. The patient's friend reported that the patient fell and hit his head. The patient denies any fever. He denies any recent change to his medications. His home care provider notes he had a seizure in July.      REVIEW OF SYSTEMS  As above, all other systems reviewed and are negative.   See HPI for further details.     PAST MEDICAL HISTORY   has a past medical history of Epilepsy (HCC), Hypoactive thyroid (2010), and Seizure disorder (HCC).  SURGICAL HISTORY  patient denies any surgical history  SOCIAL HISTORY  Social History     Tobacco Use    Smoking status: Never    Smokeless tobacco: Never   Vaping Use    Vaping Use: Some days    Substances: Nicotine   Substance Use Topics    Alcohol use: Yes     Comment: \"couple times a year\"    Drug use: Yes     " "Types: Inhaled     Comment: marijuana vape      Social History     Substance and Sexual Activity   Drug Use Yes    Types: Inhaled    Comment: marijuana vape     FAMILY HISTORY  History reviewed. No pertinent family history.  CURRENT MEDICATIONS  Home Medications       Reviewed by Nessa Ni R.N. (Registered Nurse) on 09/30/23 at 2145  Med List Status: Not Addressed     Medication Last Dose Status   divalproex ER (DEPAKOTE ER) 500 MG TABLET SR 24 HR  Active   folic acid (FOLVITE) 1 MG Tab  Active   lamotrigine (LAMICTAL) 200 MG tablet  Active   levETIRAcetam (KEPPRA) 500 MG Tab  Active   levothyroxine (SYNTHROID) 88 MCG Tab  Active                  ALLERGIES  No Known Allergies    PHYSICAL EXAM    VITAL SIGNS:   Vitals:    09/30/23 2143 09/30/23 2144   BP:  132/76   Pulse:  87   Resp:  16   Temp:  36.8 °C (98.2 °F)   TempSrc:  Temporal   SpO2:  96%   Weight: 72.6 kg (160 lb)    Height: 1.727 m (5' 8\")        Vitals: My interpretation: normotensive, not tachycardic, afebrile, not hypoxic    Reinterpretation of vitals: Unchanged unremarkable    Cardiac Monitor Interpretation: The cardiac monitor revealed normal Sinus Rhythm as interpreted by me. The cardiac monitor was ordered secondary to the patient's history of possible seizure and to monitor for dysrhythmia and/or tachycardia.    PE:   Gen: sitting comfortably, speaking clearly, appears in no acute distress   ENT: Mucous membranes moist, posterior pharynx clear, uvula midline, nares patent bilaterally.   Neck: Supple, FROM  Pulmonary: Lungs are clear to auscultation bilaterally. No tachypnea  CV:  RRR, no murmur appreciated, pulses 2+ in both upper and lower extremities  Abdomen: soft, NT/ND; no rebound/guarding  : no CVA or suprapubic tenderness   Neuro: A&Ox4 (person, place, time, situation), speech fluent, gait steady, no focal deficits appreciated  Skin: No rash or lesions.  No pallor or jaundice.  No cyanosis.  Warm and dry. Contusion to the left " forehead. Abrasion to the left back shoulder.    DIAGNOSTIC STUDIES / PROCEDURES    LABS    RADIOLOGY  I have independently interpreted the diagnostic imaging associated with this visit and am waiting the final reading from the radiologist.   My preliminary interpretation is a follows: CTA scan of the head and my depend interpretation shows no obvious intracranial hemorrhage  Radiologist interpretation is as follows:  DX-SHOULDER 2+ LEFT   Final Result         1.  No acute traumatic bony injury.         CT-HEAD W/O   Final Result         1.  No acute intracranial abnormality.           COURSE & MEDICAL DECISION MAKING  Nursing notes, VS, PMSFHx, labs, imaging, EKG reviewed in chart.    ED Observation Status? No; Patient does not meet criteria for ED Observation.     Ddx: Strain, sprain, fracture, dislocation, seizure, meningitis    MDM: 9:55 PM Sang Magaña is a 23 y.o. male who presented with known history of seizure disorder with tonic-clonic grand mal seizures with breakthrough seizures about 3-4 times a year, currently on Lamictal, Depakote, Keppra, history of hypothyroidism, presenting today for a tonic-clonic seizure that lasted about 2 minutes, was witnessed.  Patient states normally gets an aura before he has a seizure so he can sit down but this time he started seizing standing up and struck the left side of his head and his shoulder on the ground.  Brought in by EMS.  EMS provided helpful collateral patient regarding patient's presentation today.  Patient denies any recent illness, fevers, IV drug use.  Did not lose bladder or bowel continence during the seizure or bite his tongue although he was postictal after the event.  Upon arrival here patient is back to baseline, his friend at bedside helps provide collateral formation regarding his presentation.  His vital signs are completely unremarkable.  His exam shows a contusion to the left forehead and abrasion to the left shoulder but no signs of  dislocation and he has an intact neurological and physical exam otherwise.  X-ray of the shoulder was ordered, CT scan of the head was ordered.  CT scan of the head was negative for intracranial hemorrhage or space-occupying lesion on my independent interpretation, radiologist agrees.  X-ray shows no fracture or dislocation.  At this time as patient has known history of seizures with relatively frequent breakthrough seizures, and no recent medication changes, I think he is stable for discharge.  He will follow-up with his neurologist to consider medication changes and will return if he has any worsening symptoms.  Him and his friend at bedside both verbalized understanding and are amenable.  Tylenol, Motrin prescription sent to the pharmacy for the patient, instructions on ice for the [place] contusion in the shoulder.    ADDITIONAL PROBLEM LIST AND DISPOSITION    I have discussed management of the patient with the following physicians and LAURA's: None    Discussion of management with other QHP or appropriate source(s): None     Escalation of care considered, and ultimately not performed:acute inpatient care management, however at this time, the patient is most appropriate for outpatient management    Barriers to care at this time, including but not limited to:  None known .     Decision tools and prescription drugs considered including, but not limited to: NIH Stroke Scale 0 .  Tylenol and Motrin prescription sent to the pharmacy for the patient    FINAL IMPRESSION  1. Contusion of forehead, initial encounter Acute   2. Breakthrough seizure (HCC) Acute   3. Nonintractable epilepsy without status epilepticus, unspecified epilepsy type (HCC) Acute   4. Abrasion of left shoulder, initial encounter Acute   5. Traumatic injury of head, initial encounter Acute       I, My Taylor (Scribe), am scribing for, and in the presence of, Kirill Roque.    Electronically signed by: My Taylor  (Scribe), 9/30/2023    IKirill personally performed the services described in this documentation, as scribed by My Taylor in my presence, and it is both accurate and complete.    The note accurately reflects work and decisions made by me.  Kirill Roque  9/30/2023  10:23 PM

## 2023-10-01 NOTE — ED NOTES
Pt and caregiver at bedside educated regarding discharge instructions and demonstrated understanding. Pt ambulatory upon discharge and does not appear to be in any distress at this time. Pt's discharge paperwork and belongings sent home with pt and caregiver.

## 2023-10-01 NOTE — DISCHARGE INSTRUCTIONS
Thankfully your CT head was negative and your chest x-ray of your shoulder was normal.  You can take Tylenol or Motrin to help with pain from the bruising over the next few days I have sent prescriptions to the pharmacy for you.  You can follow-up with your neurologist to discuss medication changes or management if you still continue to have breakthrough seizures.  If you have any concerns or worsening symptoms, please return to the ED for further evaluation.  Thank you for coming in today.

## 2023-12-06 ENCOUNTER — OFFICE VISIT (OUTPATIENT)
Dept: INTERNAL MEDICINE | Facility: OTHER | Age: 23
End: 2023-12-06
Payer: MEDICAID

## 2023-12-06 VITALS
OXYGEN SATURATION: 95 % | DIASTOLIC BLOOD PRESSURE: 69 MMHG | HEIGHT: 69 IN | SYSTOLIC BLOOD PRESSURE: 111 MMHG | HEART RATE: 75 BPM | WEIGHT: 157.2 LBS | BODY MASS INDEX: 23.28 KG/M2 | TEMPERATURE: 98.9 F

## 2023-12-06 DIAGNOSIS — E03.9 HYPOTHYROIDISM (ACQUIRED): ICD-10-CM

## 2023-12-06 DIAGNOSIS — E53.8 FOLIC ACID DEFICIENCY: ICD-10-CM

## 2023-12-06 DIAGNOSIS — L98.9 SKIN LESIONS: ICD-10-CM

## 2023-12-06 DIAGNOSIS — G40.909 SEIZURE DISORDER (HCC): ICD-10-CM

## 2023-12-06 PROCEDURE — 3078F DIAST BP <80 MM HG: CPT

## 2023-12-06 PROCEDURE — 99213 OFFICE O/P EST LOW 20 MIN: CPT | Mod: GE

## 2023-12-06 PROCEDURE — 3074F SYST BP LT 130 MM HG: CPT

## 2023-12-06 RX ORDER — LEVOTHYROXINE SODIUM 88 UG/1
88 TABLET ORAL
Qty: 90 TABLET | Refills: 1 | Status: SHIPPED | OUTPATIENT
Start: 2023-12-06 | End: 2024-02-09 | Stop reason: SDUPTHER

## 2023-12-06 RX ORDER — FOLIC ACID 1 MG/1
1 TABLET ORAL DAILY
Qty: 90 TABLET | Refills: 1 | Status: SHIPPED | OUTPATIENT
Start: 2023-12-06 | End: 2024-02-09 | Stop reason: SDUPTHER

## 2023-12-06 ASSESSMENT — ENCOUNTER SYMPTOMS
SEIZURES: 0
HEARTBURN: 0
DIZZINESS: 0
HEADACHES: 0
SPUTUM PRODUCTION: 0
COUGH: 0
SPEECH CHANGE: 0
NECK PAIN: 0
DEPRESSION: 0
LOSS OF CONSCIOUSNESS: 0
DIARRHEA: 0
SORE THROAT: 0
MYALGIAS: 0
VOMITING: 0
CONSTIPATION: 0
SINUS PAIN: 0
PALPITATIONS: 0
BACK PAIN: 0
SHORTNESS OF BREATH: 0
DOUBLE VISION: 0
BLOOD IN STOOL: 0
FEVER: 0
BLURRED VISION: 0
NERVOUS/ANXIOUS: 0
SENSORY CHANGE: 0
ABDOMINAL PAIN: 0
NAUSEA: 0
WEIGHT LOSS: 0

## 2023-12-06 ASSESSMENT — FIBROSIS 4 INDEX: FIB4 SCORE: 0.47

## 2023-12-07 NOTE — ASSESSMENT & PLAN NOTE
Known diagnosis.  Last TSH: 0.850 (08/24/23).   Compliant w/Levothyroxine 88 mcg daily, fasting.     Plan:  - Continue current dose of Levothyroxine; placed refill.

## 2023-12-07 NOTE — ASSESSMENT & PLAN NOTE
Known diagnosis.   Compliant w/Keppra 750 mg BID, Lamictal 200 mg daily, Depakote  mg daily.  Had tonic/clonic seizure episode on 09/30/23. Since then 2 episodes of absence seizures.     Plan:  - Follow-up w/Neurology as scheduled and discuss recurrent absence seizures to consider change in dosing.   - Continue to use helmet for protection while doing BMX, skating.

## 2023-12-07 NOTE — ASSESSMENT & PLAN NOTE
Patient had referral sent for Dermatology at last appointment. It has been approved for office at [place]. Placed new referral to be sent for [facility].

## 2023-12-07 NOTE — PATIENT INSTRUCTIONS
Thank you for visiting us today!    Continue using helmet while doing BMX and skating.   Follow-up with Neurology as recommended and discuss episodes of absence seizures.   Follow-up as needed.

## 2023-12-07 NOTE — PROGRESS NOTES
"    OFFICE VISIT    Sang Magaña is a 23 y.o. male with PMH of Epilepsy, Hypothyroidism.     Reason for visit:  Annual exam.   Need for medication refills.     HPI:  Patient presents accompanied by caretaker/guardian. Discussed the following:    Seizures. Had episode on 09/30/23; tonic-clonic. No recurrence since but 2 episodes of absence seizures. Denies noticing changes in his cognition, not reported by guardian either. Compliant with his medications; Keppra was increased to 750 mg BID by Neurology on 10/2023. Has follow-up in Jan, 2024; suggested to discussed recurrent absence seizures w/neurologist.     Review of Systems   Constitutional:  Negative for fever, malaise/fatigue and weight loss.   HENT:  Negative for congestion, ear discharge, hearing loss, sinus pain, sore throat and tinnitus.    Eyes:  Negative for blurred vision and double vision.   Respiratory:  Negative for cough, sputum production and shortness of breath.    Cardiovascular:  Negative for chest pain, palpitations and leg swelling.   Gastrointestinal:  Negative for abdominal pain, blood in stool, constipation, diarrhea, heartburn, melena, nausea and vomiting.   Genitourinary:  Negative for dysuria and frequency.   Musculoskeletal:  Negative for back pain, joint pain, myalgias and neck pain.   Skin:         Recurrent warts, upper extremities, distally.    Neurological:  Negative for dizziness, sensory change, speech change, seizures, loss of consciousness and headaches.   Psychiatric/Behavioral:  Negative for depression. The patient is not nervous/anxious.        /69 (BP Location: Left arm, Patient Position: Sitting, BP Cuff Size: Adult)   Pulse 75   Temp 37.2 °C (98.9 °F) (Temporal)   Ht 1.74 m (5' 8.5\")   Wt 71.3 kg (157 lb 3.2 oz)   SpO2 95%   BMI 23.55 kg/m²     Physical Exam  Constitutional:       General: He is not in acute distress.     Appearance: Normal appearance. He is not ill-appearing.   HENT:      Head: " Normocephalic and atraumatic.      Right Ear: Tympanic membrane, ear canal and external ear normal. There is no impacted cerumen.      Left Ear: Tympanic membrane, ear canal and external ear normal. There is no impacted cerumen.      Nose: Nose normal. No congestion.      Mouth/Throat:      Mouth: Mucous membranes are moist.      Pharynx: Oropharynx is clear.   Eyes:      General: No scleral icterus.     Extraocular Movements: Extraocular movements intact.      Pupils: Pupils are equal, round, and reactive to light.   Cardiovascular:      Rate and Rhythm: Normal rate and regular rhythm.      Pulses: Normal pulses.      Heart sounds: Normal heart sounds.   Pulmonary:      Effort: Pulmonary effort is normal. No respiratory distress.      Breath sounds: Normal breath sounds.   Chest:      Chest wall: No tenderness.   Abdominal:      General: Bowel sounds are normal. There is no distension.      Palpations: Abdomen is soft.      Tenderness: There is no abdominal tenderness. There is no right CVA tenderness or left CVA tenderness.   Musculoskeletal:         General: No swelling or tenderness. Normal range of motion.      Cervical back: Normal range of motion. No tenderness.      Right lower leg: No edema.      Left lower leg: No edema.   Lymphadenopathy:      Cervical: No cervical adenopathy.   Skin:     General: Skin is warm.      Capillary Refill: Capillary refill takes less than 2 seconds.      Coloration: Skin is not jaundiced or pale.      Findings: No lesion or rash.   Neurological:      Comments: Alert and oriented to person, time, and place  Follows commands  Speech is fluent  Pupils reactive to light and accomodation  Occular movements preserved  Facial symmetry preserved  Tongue is midline  Able to tolerate antigravity bilaterally, upper and lower extremities  No pronator drift  Sensation preserved throughout   DTR preserved, 2+ throughout         Assessment and Plan:   23 y.o. male with PMH of Epilepsy,  Hypothyroidism.     Seizure disorder (HCC)  Known diagnosis.   Compliant w/Keppra 750 mg BID, Lamictal 200 mg daily, Depakote  mg daily.  Had tonic/clonic seizure episode on 09/30/23. Since then 2 episodes of absence seizures.     Plan:  - Follow-up w/Neurology as scheduled and discuss recurrent absence seizures to consider change in dosing.   - Continue to use helmet for protection while doing BMX, skating.     Hypothyroidism (acquired)  Known diagnosis.  Last TSH: 0.850 (08/24/23).   Compliant w/Levothyroxine 88 mcg daily, fasting.     Plan:  - Continue current dose of Levothyroxine; placed refill.     Skin lesions  Patient had referral sent for Dermatology at last appointment. It has been approved for office at [facility]. Placed new referral to be sent for [facility].     Orders Placed This Encounter    Referral to Dermatology    levothyroxine (SYNTHROID) 88 MCG Tab    folic acid (FOLVITE) 1 MG Tab       No follow-ups on file.      Patient case was seen/ assessed/ discussed with Dr. Jaime.     Signed by:    Bernard Hoffmann M.D.  PGY-2 Internal Medicine Resident

## 2024-01-11 ENCOUNTER — TELEPHONE (OUTPATIENT)
Dept: INTERNAL MEDICINE | Facility: OTHER | Age: 24
End: 2024-01-11
Payer: MEDICAID

## 2024-01-11 NOTE — TELEPHONE ENCOUNTER
Patient is ready to schedule for derm.   Not able to reach pt. VM is not set up. Mychart not set up.

## 2024-01-18 ENCOUNTER — OFFICE VISIT (OUTPATIENT)
Dept: MEDICAL GROUP | Facility: MEDICAL CENTER | Age: 24
End: 2024-01-18
Payer: MEDICAID

## 2024-01-18 VITALS
DIASTOLIC BLOOD PRESSURE: 70 MMHG | WEIGHT: 151.4 LBS | BODY MASS INDEX: 22.42 KG/M2 | OXYGEN SATURATION: 96 % | RESPIRATION RATE: 16 BRPM | HEART RATE: 80 BPM | HEIGHT: 69 IN | SYSTOLIC BLOOD PRESSURE: 110 MMHG | TEMPERATURE: 97.7 F

## 2024-01-18 DIAGNOSIS — G40.909 SEIZURE DISORDER (HCC): ICD-10-CM

## 2024-01-18 DIAGNOSIS — E03.9 HYPOTHYROIDISM (ACQUIRED): ICD-10-CM

## 2024-01-18 DIAGNOSIS — F39 MOOD DISORDER (HCC): ICD-10-CM

## 2024-01-18 PROBLEM — S06.0X1A CONCUSSION WITH BRIEF LOC: Status: RESOLVED | Noted: 2023-05-16 | Resolved: 2024-01-18

## 2024-01-18 PROBLEM — S01.119A EYELID LACERATION: Status: RESOLVED | Noted: 2023-05-16 | Resolved: 2024-01-18

## 2024-01-18 PROBLEM — M25.562 LEFT KNEE PAIN: Status: RESOLVED | Noted: 2023-05-16 | Resolved: 2024-01-18

## 2024-01-18 PROCEDURE — 99214 OFFICE O/P EST MOD 30 MIN: CPT

## 2024-01-18 PROCEDURE — 3078F DIAST BP <80 MM HG: CPT

## 2024-01-18 PROCEDURE — 99213 OFFICE O/P EST LOW 20 MIN: CPT

## 2024-01-18 PROCEDURE — 3074F SYST BP LT 130 MM HG: CPT

## 2024-01-18 RX ORDER — HYDROXYZINE HYDROCHLORIDE 25 MG/1
25 TABLET, FILM COATED ORAL 3 TIMES DAILY PRN
Qty: 30 TABLET | Refills: 2 | Status: SHIPPED | OUTPATIENT
Start: 2024-01-18

## 2024-01-18 ASSESSMENT — ANXIETY QUESTIONNAIRES
GAD7 TOTAL SCORE: 11
2. NOT BEING ABLE TO STOP OR CONTROL WORRYING: SEVERAL DAYS
5. BEING SO RESTLESS THAT IT IS HARD TO SIT STILL: SEVERAL DAYS
6. BECOMING EASILY ANNOYED OR IRRITABLE: SEVERAL DAYS
3. WORRYING TOO MUCH ABOUT DIFFERENT THINGS: SEVERAL DAYS
1. FEELING NERVOUS, ANXIOUS, OR ON EDGE: MORE THAN HALF THE DAYS
7. FEELING AFRAID AS IF SOMETHING AWFUL MIGHT HAPPEN: MORE THAN HALF THE DAYS
4. TROUBLE RELAXING: NEARLY EVERY DAY

## 2024-01-18 ASSESSMENT — PATIENT HEALTH QUESTIONNAIRE - PHQ9
5. POOR APPETITE OR OVEREATING: 0 - NOT AT ALL
SUM OF ALL RESPONSES TO PHQ QUESTIONS 1-9: 16
CLINICAL INTERPRETATION OF PHQ2 SCORE: 4

## 2024-01-18 ASSESSMENT — FIBROSIS 4 INDEX: FIB4 SCORE: 0.47

## 2024-01-19 NOTE — ASSESSMENT & PLAN NOTE
Patient is currently taking levothyroxine 88 mcg. He denies any negative side effects from the medication.     Latest Reference Range & Units 08/24/23 14:46   TSH 0.380 - 5.330 uIU/mL 0.850   Free T-4 0.93 - 1.70 ng/dL 1.76 (H)

## 2024-01-19 NOTE — PROGRESS NOTES
"Subjective:     CC:  Diagnoses of Seizure disorder (HCC), Hypothyroidism (acquired), and Mood disorder (HCC) were pertinent to this visit.    HISTORY OF THE PRESENT ILLNESS: Patient is a 23 y.o. male. This pleasant patient is here today to establish care.    Seizure disorder (HCC)  Chronic. Patients first seizure was when he was 2-2yo. He is compliant with Keppra 750 mg BID, Lamictal 200 mg daily, Depakote  mg two times daily. His last seizure was is in October 2023 he reports it as being a grand mal seizure.     Hypothyroidism (acquired)  Patient is currently taking levothyroxine 88 mcg. He denies any negative side effects from the medication.     Latest Reference Range & Units 08/24/23 14:46   TSH 0.380 - 5.330 uIU/mL 0.850   Free T-4 0.93 - 1.70 ng/dL 1.76 (H)       Health Maintenance: reviewed and completed per patient preference.     ROS:   - CONSTITUTIONAL: Denies weight loss, fever and chills.  - HEENT: Denies changes in vision and hearing.  - RESPIRATORY: Denies SOB and cough.  - CV: Denies palpitations and CP.  - GI: Denies abdominal pain, nausea, vomiting and diarrhea.  - : Denies dysuria and urinary frequency.  - MSK: Denies myalgia and joint pain.  - SKIN: Denies rash and pruritus.  - NEUROLOGICAL: Denies headache and syncope.  - PSYCHIATRIC: Denies recent changes in mood. Denies anxiety and depression.           Social History     Socioeconomic History    Marital status: Single     Spouse name: Not on file    Number of children: Not on file    Years of education: Not on file    Highest education level: Not on file   Occupational History    Not on file   Tobacco Use    Smoking status: Never    Smokeless tobacco: Never   Vaping Use    Vaping Use: Some days    Substances: Nicotine   Substance and Sexual Activity    Alcohol use: Yes     Comment: \"couple times a year\"    Drug use: Yes     Types: Inhaled     Comment: marijuana vape    Sexual activity: Not on file   Other Topics Concern    Not on file " "  Social History Narrative    ** Merged History Encounter **          Social Determinants of Health     Financial Resource Strain: Not on file   Food Insecurity: Not on file   Transportation Needs: Not on file   Physical Activity: Not on file   Stress: Not on file   Social Connections: Not on file   Intimate Partner Violence: Not on file   Housing Stability: Not on file      No Known Allergies         Current Outpatient Medications:     hydrOXYzine HCl, 25 mg, Oral, TID PRN    levothyroxine, 88 mcg, Oral, AM ES    folic acid, 1 mg, Oral, DAILY    levETIRAcetam, 500 mg, Oral, BID (Patient taking differently: 750 mg, Oral, 2 TIMES DAILY)    lamotrigine, 200 mg, Oral, DAILY    divalproex ER, 500 mg, Oral, BID   Objective:     Exam: /70 (BP Location: Left arm, Patient Position: Sitting, BP Cuff Size: Adult)   Pulse 80   Temp 36.5 °C (97.7 °F) (Temporal)   Resp 16   Ht 1.753 m (5' 9\")   Wt 68.7 kg (151 lb 6.4 oz)   SpO2 96%  Body mass index is 22.36 kg/m².    Physical Exam:  Constitutional: Alert, no distress, well-groomed.  Skin: Warm, dry, good turgor, no rashes in visible areas.  Eye: Equal, round and reactive, conjunctiva clear, lids normal.  ENMT: Lips without lesions, good dentition, moist mucous membranes.  Neck: Trachea midline, no masses, no thyromegaly.  Respiratory: Unlabored respiratory effort, no cough.  Abd: soft, non tender, non distended, normal BS  MSK: Normal gait, moves all extremities.  Neuro: Grossly non-focal.   Psych: Alert and oriented x3, normal affect and mood.    Labs: Reviewed    Assessment & Plan:   23 y.o. male with the following -    There are no diagnoses linked to this encounter.    1. Seizure disorder (HCC)  Chronic, well controlled with Depakote, Lamictal and Keppra. Patient is followed closely by Neurology. Patient denies any negative side effects from the medication and would like to continue. Follow up precautions given.       2. Hypothyroidism (acquired)  Chronic, " controlled. He is on levothyroxine and tolerating it well. Last TSH in 08/2023 was within normal limits, so we will continue the current dosing. Patient reports that his Neurologist is currently managing his levothyroxine to optimize his seizure control.       3. Mood disorder (HCC)  - Chronic condition; uncontrolled. Reviewed treatment options including medication and counseling/therapy.  - Cognitive behavioral therapy (CBT) is an effective treatment for ANTOLIN as are SSRIs.  SSRIs usually take 4-6 wks to titrate to have an effect. Patient would prefer to be seen by Psychiatry. He has previously seen Rian Raya and would like to see his again.    - Counseling for stress mgmt techniques - consult placed to Behavioral Health  - Informed the patient of [email or web address] and the resources that are available  - F/u with me in four weeks or sooner as needed  - ER for SI/SA/HI    - Patient has been identified as having a positive depression screening. Appropriate orders and counseling have been given.  - Referral to Psychiatry  - hydrOXYzine HCl (ATARAX) 25 MG Tab; Take 1 Tablet by mouth 3 times a day as needed for Anxiety.  Dispense: 30 Tablet; Refill: 2        Return in about 6 weeks (around 2/29/2024) for Mood Check.    Please note that this dictation was created using voice recognition software. I have made every reasonable attempt to correct obvious errors, but I expect that there are errors of grammar and possibly content that I did not discover before finalizing the note.

## 2024-01-19 NOTE — ASSESSMENT & PLAN NOTE
Chronic. Patients first seizure was when he was 2-4yo. He is compliant with Keppra 750 mg BID, Lamictal 200 mg daily, Depakote  mg two times daily. His last seizure was is in October 2023 he reports it as being a grand mal seizure.

## 2024-02-09 DIAGNOSIS — G40.409 EPILEPSY SYMPTOMATIC, GENERALIZED (HCC): ICD-10-CM

## 2024-02-09 DIAGNOSIS — E03.9 HYPOTHYROIDISM (ACQUIRED): ICD-10-CM

## 2024-02-09 DIAGNOSIS — E53.8 FOLIC ACID DEFICIENCY: ICD-10-CM

## 2024-02-09 RX ORDER — FOLIC ACID 1 MG/1
1 TABLET ORAL DAILY
Qty: 90 TABLET | Refills: 1 | Status: SHIPPED | OUTPATIENT
Start: 2024-02-09

## 2024-02-09 RX ORDER — LEVETIRACETAM 750 MG/1
750 TABLET ORAL 2 TIMES DAILY
Qty: 60 TABLET | Refills: 1 | Status: SHIPPED | OUTPATIENT
Start: 2024-02-09

## 2024-02-09 RX ORDER — LAMOTRIGINE 200 MG/1
200 TABLET ORAL DAILY
Qty: 30 TABLET | Refills: 0 | Status: SHIPPED | OUTPATIENT
Start: 2024-02-09

## 2024-02-09 RX ORDER — LEVOTHYROXINE SODIUM 88 UG/1
88 TABLET ORAL
Qty: 90 TABLET | Refills: 1 | Status: SHIPPED | OUTPATIENT
Start: 2024-02-09

## 2024-02-09 RX ORDER — DIVALPROEX SODIUM 500 MG/1
500 TABLET, EXTENDED RELEASE ORAL 2 TIMES DAILY
Qty: 60 TABLET | Refills: 0 | Status: SHIPPED | OUTPATIENT
Start: 2024-02-09

## 2024-02-09 NOTE — TELEPHONE ENCOUNTER
Received request via: Pharmacy    Was the patient seen in the last year in this department? Yes    Does the patient have an active prescription (recently filled or refills available) for medication(s) requested? No    [facility]     Does the patient have snf Plus and need 100 day supply (blood pressure, diabetes and cholesterol meds only)? Patient does not have SCP

## 2024-02-09 NOTE — TELEPHONE ENCOUNTER
Received request via: Pharmacy    Was the patient seen in the last year in this department? Yes    Does the patient have an active prescription (recently filled or refills available) for medication(s) requested? Yes. Pt has one refill left for the Folic Acid and Levothyroxine. Pt is out of [facility]     [facility]: [facility]     Does the patient have detention Plus and need 100 day supply (blood pressure, diabetes and cholesterol meds only)? Patient does not have SCP

## 2024-02-29 ENCOUNTER — OFFICE VISIT (OUTPATIENT)
Dept: MEDICAL GROUP | Facility: MEDICAL CENTER | Age: 24
End: 2024-02-29
Payer: MEDICAID

## 2024-02-29 VITALS
HEART RATE: 71 BPM | RESPIRATION RATE: 16 BRPM | HEIGHT: 69 IN | TEMPERATURE: 98.2 F | WEIGHT: 151 LBS | BODY MASS INDEX: 22.36 KG/M2 | SYSTOLIC BLOOD PRESSURE: 100 MMHG | DIASTOLIC BLOOD PRESSURE: 70 MMHG | OXYGEN SATURATION: 96 %

## 2024-02-29 DIAGNOSIS — B07.8 OTHER VIRAL WARTS: ICD-10-CM

## 2024-02-29 PROCEDURE — 3074F SYST BP LT 130 MM HG: CPT

## 2024-02-29 PROCEDURE — 17110 DESTRUCTION B9 LES UP TO 14: CPT

## 2024-02-29 PROCEDURE — 3078F DIAST BP <80 MM HG: CPT

## 2024-02-29 PROCEDURE — 99213 OFFICE O/P EST LOW 20 MIN: CPT

## 2024-02-29 ASSESSMENT — FIBROSIS 4 INDEX: FIB4 SCORE: 0.47

## 2024-02-29 NOTE — ASSESSMENT & PLAN NOTE
Patient reports multiple warms on hands. He reports that they always come back but they dont bleed. He has dont cryotherapy previously and would like to do it again.

## 2024-02-29 NOTE — PROGRESS NOTES
"Subjective:     CC: wart cryotherapy     HPI:   Sang presents today with    Other viral warts  Patient reports multiple warms on hands. He reports that they always come back but they dont bleed. He has dont cryotherapy previously and would like to do it again.          ROS:  - CONSTITUTIONAL: Denies weight loss, fever and chills.  - HEENT: Denies changes in vision and hearing.  - RESPIRATORY: Denies SOB and cough.  - CV: Denies palpitations and CP.  - GI: Denies abdominal pain, nausea, vomiting and diarrhea.  - : Denies dysuria and urinary frequency.  - MSK: Denies myalgia and joint pain.  - SKIN: Denies rash and pruritus.  - NEUROLOGICAL: Denies headache and syncope.  - PSYCHIATRIC: Denies recent changes in mood. Denies anxiety and depression.    Please see HPI for additional ROS.       Objective:     Exam:  /70 (BP Location: Right arm, Patient Position: Sitting, BP Cuff Size: Adult)   Pulse 71   Temp 36.8 °C (98.2 °F) (Temporal)   Resp 16   Ht 1.753 m (5' 9\")   Wt 68.5 kg (151 lb)   SpO2 96%   BMI 22.30 kg/m²  Body mass index is 22.3 kg/m².    Physical Exam:  Constitutional: Alert, no distress, well-groomed.  Skin: Warm, dry, good turgor, no rashes in visible areas.  6 warts on the left hand and the left knee.  Eye: Equal, round and reactive, conjunctiva clear, lids normal.  ENMT: Lips without lesions, good dentition, moist mucous membranes.  Neck: Trachea midline, no masses, no thyromegaly.  Respiratory: Unlabored respiratory effort, no cough.  Abd: soft, non tender, non distended, normal BS  MSK: Normal gait, moves all extremities.  Neuro: Grossly non-focal.   Psych: Alert and oriented x3, normal affect and mood.    CRYOTHERAPY:  Discussed risks and benefits of cryotherapy. Patient verbally agreed. 3 applications of cryotherapy were applied to 3 lesion on the left hand and 3 lesions on the left. Patient tolerated procedure well. Aftercare instructions given. 0/10 pain prior to treatment.  0 out " of 10 pain posttreatment.      Labs: Reviewed    Assessment & Plan:     23 y.o. male with the following -     1. Other viral warts  Chronic, stable Patient is requesting cryotherapy on 6 warts today. He reports that he has tolerated the procedure previously. Discussed the benign nature of these lesions.  Verbal consent was obtained. Immediately prior to procedure, a time out was called to verify the correct patient, procedure, equipment, support staff and site/side marked as required. Pre-procedure pain reported as 0/10. Cryotherapy performed using liquid nitrogen, freeze-thaw-freeze technique x 3.  Patient tolerated the procedure well.  Post-procedure was 0/10. Aftercare as well as potential for blistering was discussed.  I explained that the procedure may need to be repeated if there is not complete resolution.          Return if symptoms worsen or fail to improve.    Please note that this dictation was created using voice recognition software. I have made every reasonable attempt to correct obvious errors, but I expect that there are errors of grammar and possibly content that I did not discover before finalizing the note.

## 2024-05-01 ENCOUNTER — OFFICE VISIT (OUTPATIENT)
Dept: MEDICAL GROUP | Facility: MEDICAL CENTER | Age: 24
End: 2024-05-01
Payer: MEDICAID

## 2024-05-01 VITALS
TEMPERATURE: 98.5 F | WEIGHT: 149.9 LBS | OXYGEN SATURATION: 95 % | HEART RATE: 72 BPM | HEIGHT: 68 IN | BODY MASS INDEX: 22.72 KG/M2 | SYSTOLIC BLOOD PRESSURE: 120 MMHG | RESPIRATION RATE: 16 BRPM | DIASTOLIC BLOOD PRESSURE: 82 MMHG

## 2024-05-01 DIAGNOSIS — Z20.2 POSSIBLE EXPOSURE TO STI: ICD-10-CM

## 2024-05-01 PROCEDURE — 3074F SYST BP LT 130 MM HG: CPT

## 2024-05-01 PROCEDURE — 3079F DIAST BP 80-89 MM HG: CPT

## 2024-05-01 PROCEDURE — 99213 OFFICE O/P EST LOW 20 MIN: CPT

## 2024-05-01 ASSESSMENT — FIBROSIS 4 INDEX: FIB4 SCORE: 0.47

## 2024-05-01 NOTE — PROGRESS NOTES
"Subjective:     CC: Possible STD exposure    HPI:   Sang presents today with    Possible exposure to STI  Sang's concern today is an ex-girlfriend reports that she now has herpes and she is concerned that she got it from him.  He would like to do a full STD panel today including HSV, HIV, gonorrhea chlamydia, syphilis.  He denies any symptoms or lesions.         ROS:  - CONSTITUTIONAL: Denies weight loss, fever and chills.  - HEENT: Denies changes in vision and hearing.  - RESPIRATORY: Denies SOB and cough.  - CV: Denies palpitations and CP.  - GI: Denies abdominal pain, nausea, vomiting and diarrhea.  - : Denies dysuria and urinary frequency.  - MSK: Denies myalgia and joint pain.  - SKIN: Denies rash and pruritus.  - NEUROLOGICAL: Denies headache and syncope.  - PSYCHIATRIC: Denies recent changes in mood. Denies anxiety and depression.    Please see HPI for additional ROS.       Objective:     Exam:  /82 (BP Location: Left arm, Patient Position: Sitting, BP Cuff Size: Adult)   Pulse 72   Temp 36.9 °C (98.5 °F) (Temporal)   Resp 16   Ht 1.727 m (5' 8\")   Wt 68 kg (149 lb 14.4 oz)   SpO2 95%   BMI 22.79 kg/m²  Body mass index is 22.79 kg/m².    Physical Exam:  Constitutional: Alert, no distress, well-groomed.  Skin: Warm, dry, good turgor, no rashes in visible areas.  Eye: Equal, round and reactive, conjunctiva clear, lids normal.  ENMT: Lips without lesions, good dentition, moist mucous membranes.  Neck: Trachea midline, no masses, no thyromegaly.  Respiratory: Unlabored respiratory effort, no cough.  Abd: soft, non tender, non distended, normal BS  MSK: Normal gait, moves all extremities.  Neuro: Grossly non-focal.   Psych: Alert and oriented x3, normal affect and mood.    Labs: Reviewed    Assessment & Plan:     23 y.o. male with the following -     1. Possible exposure to STI  Acute issue.  Asymptomatic.  Discussed limiting sexual activity until his results come back.  Follow-up precautions " given.  - RPR (SYPHILIS); Future  - HEP C VIRUS ANTIBODY; Future  - HSV 1/2 IGG W/ TYPE SPECIFIC RFLX; Future  - Chlamydia/GC, PCR (Urine); Future  - HIV AG/AB COMBO ASSAY SCREENING; Future      No follow-ups on file.    Please note that this dictation was created using voice recognition software. I have made every reasonable attempt to correct obvious errors, but I expect that there are errors of grammar and possibly content that I did not discover before finalizing the note.

## 2024-05-02 NOTE — ASSESSMENT & PLAN NOTE
Sang's concern today is an ex-girlfriend reports that she now has herpes and she is concerned that she got it from him.  He would like to do a full STD panel today including HSV, HIV, gonorrhea chlamydia, syphilis.  He denies any symptoms or lesions.

## 2024-05-22 ENCOUNTER — HOSPITAL ENCOUNTER (OUTPATIENT)
Dept: LAB | Facility: MEDICAL CENTER | Age: 24
End: 2024-05-22
Payer: MEDICAID

## 2024-05-22 DIAGNOSIS — Z20.2 POSSIBLE EXPOSURE TO STI: ICD-10-CM

## 2024-05-22 LAB
HCV AB SER QL: NORMAL
HIV 1+2 AB+HIV1 P24 AG SERPL QL IA: NORMAL
T PALLIDUM AB SER QL IA: NORMAL

## 2024-05-23 LAB
C TRACH DNA SPEC QL NAA+PROBE: NEGATIVE
N GONORRHOEA DNA SPEC QL NAA+PROBE: NEGATIVE
SPECIMEN SOURCE: NORMAL

## 2024-05-24 LAB — HSV1+2 IGG SER IA-ACNC: 0.46 IV

## 2024-06-25 ENCOUNTER — OFFICE VISIT (OUTPATIENT)
Dept: MEDICAL GROUP | Facility: MEDICAL CENTER | Age: 24
End: 2024-06-25
Payer: MEDICAID

## 2024-06-25 VITALS
OXYGEN SATURATION: 98 % | HEIGHT: 68 IN | HEART RATE: 56 BPM | TEMPERATURE: 98.3 F | RESPIRATION RATE: 16 BRPM | SYSTOLIC BLOOD PRESSURE: 110 MMHG | DIASTOLIC BLOOD PRESSURE: 80 MMHG | WEIGHT: 152.8 LBS | BODY MASS INDEX: 23.16 KG/M2

## 2024-06-25 DIAGNOSIS — B07.8 OTHER VIRAL WARTS: ICD-10-CM

## 2024-06-25 PROCEDURE — 99213 OFFICE O/P EST LOW 20 MIN: CPT

## 2024-06-25 PROCEDURE — 17110 DESTRUCTION B9 LES UP TO 14: CPT

## 2024-06-25 RX ORDER — SALICYLIC ACID 275 MG/ML
1 SOLUTION TOPICAL DAILY
Qty: 10 ML | Refills: 2 | Status: SHIPPED | OUTPATIENT
Start: 2024-06-25

## 2024-06-25 ASSESSMENT — FIBROSIS 4 INDEX: FIB4 SCORE: 0.47

## 2024-06-25 NOTE — ASSESSMENT & PLAN NOTE
Patient reports multiple warts on his bilateral hands that have gotten worse since the last cryo. He reports that they always come back but they dont bleed. He would like to try cryotherapy once more and try a topical treatment also.

## 2024-06-25 NOTE — PROGRESS NOTES
"Subjective:     CC: Wart removal    HPI:   Sang presents today with    Other viral warts  Patient reports multiple warts on his bilateral hands that have gotten worse since the last cryo. He reports that they always come back but they dont bleed. He would like to try cryotherapy once more and try a topical treatment also.          ROS:  - CONSTITUTIONAL: Denies weight loss, fever and chills.  - HEENT: Denies changes in vision and hearing.  - RESPIRATORY: Denies SOB and cough.  - CV: Denies palpitations and CP.  - GI: Denies abdominal pain, nausea, vomiting and diarrhea.  - : Denies dysuria and urinary frequency.  - MSK: Denies myalgia and joint pain.  - SKIN: Denies rash and pruritus. Multiple painless warts.   - NEUROLOGICAL: Denies headache and syncope.  - PSYCHIATRIC: Denies recent changes in mood. Denies anxiety and depression.    Please see HPI for additional ROS.       Objective:     Exam:  /80 (BP Location: Right arm, Patient Position: Sitting, BP Cuff Size: Adult)   Pulse (!) 56   Temp 36.8 °C (98.3 °F) (Temporal)   Resp 16   Ht 1.727 m (5' 8\")   Wt 69.3 kg (152 lb 12.8 oz)   SpO2 98%   BMI 23.23 kg/m²  Body mass index is 23.23 kg/m².    Physical Exam:  Constitutional: Alert, no distress, well-groomed.  Skin: Warm, dry, good turgor, no rashes in visible areas. Left 3rd/4th/5th finger, right thumb  warts.   Eye: Equal, round and reactive, conjunctiva clear, lids normal.  ENMT: Lips without lesions, good dentition, moist mucous membranes.  Neck: Trachea midline, no masses, no thyromegaly.  Respiratory: Unlabored respiratory effort, no cough.  Abd: soft, non tender, non distended, normal BS  MSK: Normal gait, moves all extremities.  Neuro: Grossly non-focal.   Psych: Alert and oriented x3, normal affect and mood.    CRYOTHERAPY:  Discussed the benign nature of these lesions.     Verbal consent was obtained. Immediately prior to procedure, a time out was called to verify the correct patient, " procedure, equipment, support staff and site/side marked as required. Pre-procedure pain reported as 0/10.     cryotherapy performed using liquid nitrogen, freeze-thaw-freeze technique x 3.  Patient tolerated the procedure well.  Post-procedure was 0/10. Aftercare as well as potential for blistering was discussed.  I explained that the procedure may need to be repeated if there is not complete resolution.       Labs: Reviewed    Assessment & Plan:     23 y.o. male with the following -     1. Other viral warts  Acute on chronic, stable. Patient is requesting cryotherapy on 4 warts today. He reports that he has tolerated the procedure previously. Discussed the benign nature of these lesions.  Verbal consent was obtained. Immediately prior to procedure, a time out was called to verify the correct patient, procedure, equipment, support staff and site/side marked as required. Pre-procedure pain reported as 0/10. Cryotherapy performed using liquid nitrogen, freeze-thaw-freeze technique x 3.  Patient tolerated the procedure well.  Post-procedure was 0/10. Aftercare as well as potential for blistering was discussed.  I explained that the procedure may need to be repeated if there is not complete resolution.        Aplication instructions:  Soak wart in warm water for 5 minutes. Dry area thoroughly. Apply to entire wart surface, allow to dry, and then apply a second time. Avoid contact with surrounding skin. Continue therapy once or twice daily. Resolution may be expected after 4 to 6 weeks; some warts may take longer to remove.   - Salicylic Acid 27.5 % Liquid; Apply 1 Application topically every day.  Dispense: 10 mL; Refill: 2      Return if symptoms worsen or fail to improve.    Please note that this dictation was created using voice recognition software. I have made every reasonable attempt to correct obvious errors, but I expect that there are errors of grammar and possibly content that I did not discover before  finalizing the note.

## 2024-07-16 ENCOUNTER — HOSPITAL ENCOUNTER (OUTPATIENT)
Dept: LAB | Facility: MEDICAL CENTER | Age: 24
End: 2024-07-16
Attending: PSYCHIATRY & NEUROLOGY
Payer: MEDICAID

## 2024-07-16 LAB
ALBUMIN SERPL BCP-MCNC: 4.6 G/DL (ref 3.2–4.9)
ALBUMIN/GLOB SERPL: 2.1 G/DL
ALP SERPL-CCNC: 52 U/L (ref 30–99)
ALT SERPL-CCNC: 16 U/L (ref 2–50)
ANION GAP SERPL CALC-SCNC: 14 MMOL/L (ref 7–16)
AST SERPL-CCNC: 20 U/L (ref 12–45)
BASOPHILS # BLD AUTO: 0.9 % (ref 0–1.8)
BASOPHILS # BLD: 0.04 K/UL (ref 0–0.12)
BILIRUB SERPL-MCNC: 0.3 MG/DL (ref 0.1–1.5)
BUN SERPL-MCNC: 9 MG/DL (ref 8–22)
CALCIUM ALBUM COR SERPL-MCNC: 9.1 MG/DL (ref 8.5–10.5)
CALCIUM SERPL-MCNC: 9.6 MG/DL (ref 8.5–10.5)
CHLORIDE SERPL-SCNC: 106 MMOL/L (ref 96–112)
CO2 SERPL-SCNC: 22 MMOL/L (ref 20–33)
CREAT SERPL-MCNC: 0.74 MG/DL (ref 0.5–1.4)
EOSINOPHIL # BLD AUTO: 0.08 K/UL (ref 0–0.51)
EOSINOPHIL NFR BLD: 1.7 % (ref 0–6.9)
ERYTHROCYTE [DISTWIDTH] IN BLOOD BY AUTOMATED COUNT: 39.5 FL (ref 35.9–50)
GFR SERPLBLD CREATININE-BSD FMLA CKD-EPI: 129 ML/MIN/1.73 M 2
GLOBULIN SER CALC-MCNC: 2.2 G/DL (ref 1.9–3.5)
GLUCOSE SERPL-MCNC: 99 MG/DL (ref 65–99)
HCT VFR BLD AUTO: 46.9 % (ref 42–52)
HGB BLD-MCNC: 15.8 G/DL (ref 14–18)
IMM GRANULOCYTES # BLD AUTO: 0 K/UL (ref 0–0.11)
IMM GRANULOCYTES NFR BLD AUTO: 0 % (ref 0–0.9)
LYMPHOCYTES # BLD AUTO: 1.71 K/UL (ref 1–4.8)
LYMPHOCYTES NFR BLD: 36.8 % (ref 22–41)
MCH RBC QN AUTO: 30.7 PG (ref 27–33)
MCHC RBC AUTO-ENTMCNC: 33.7 G/DL (ref 32.3–36.5)
MCV RBC AUTO: 91.1 FL (ref 81.4–97.8)
MONOCYTES # BLD AUTO: 0.39 K/UL (ref 0–0.85)
MONOCYTES NFR BLD AUTO: 8.4 % (ref 0–13.4)
NEUTROPHILS # BLD AUTO: 2.43 K/UL (ref 1.82–7.42)
NEUTROPHILS NFR BLD: 52.2 % (ref 44–72)
NRBC # BLD AUTO: 0 K/UL
NRBC BLD-RTO: 0 /100 WBC (ref 0–0.2)
PLATELET # BLD AUTO: 269 K/UL (ref 164–446)
PMV BLD AUTO: 10.6 FL (ref 9–12.9)
POTASSIUM SERPL-SCNC: 4.2 MMOL/L (ref 3.6–5.5)
PROT SERPL-MCNC: 6.8 G/DL (ref 6–8.2)
RBC # BLD AUTO: 5.15 M/UL (ref 4.7–6.1)
SODIUM SERPL-SCNC: 142 MMOL/L (ref 135–145)
VALPROATE SERPL-MCNC: 61 UG/ML (ref 50–100)
WBC # BLD AUTO: 4.7 K/UL (ref 4.8–10.8)

## 2024-07-16 PROCEDURE — 36415 COLL VENOUS BLD VENIPUNCTURE: CPT

## 2024-07-16 PROCEDURE — 80175 DRUG SCREEN QUAN LAMOTRIGINE: CPT

## 2024-07-16 PROCEDURE — 80164 ASSAY DIPROPYLACETIC ACD TOT: CPT

## 2024-07-16 PROCEDURE — 80053 COMPREHEN METABOLIC PANEL: CPT

## 2024-07-16 PROCEDURE — 85025 COMPLETE CBC W/AUTO DIFF WBC: CPT

## 2024-07-18 ENCOUNTER — HOSPITAL ENCOUNTER (OUTPATIENT)
Dept: LAB | Facility: MEDICAL CENTER | Age: 24
End: 2024-07-18
Attending: PSYCHIATRY & NEUROLOGY
Payer: MEDICAID

## 2024-07-24 LAB — LAMOTRIGINE SERPL-MCNC: 10 UG/ML (ref 3–15)

## 2024-08-12 ENCOUNTER — APPOINTMENT (OUTPATIENT)
Dept: RADIOLOGY | Facility: MEDICAL CENTER | Age: 24
End: 2024-08-12
Attending: EMERGENCY MEDICINE
Payer: MEDICAID

## 2024-08-12 ENCOUNTER — HOSPITAL ENCOUNTER (EMERGENCY)
Facility: MEDICAL CENTER | Age: 24
End: 2024-08-12
Attending: EMERGENCY MEDICINE
Payer: MEDICAID

## 2024-08-12 VITALS
HEIGHT: 68 IN | HEART RATE: 71 BPM | WEIGHT: 152 LBS | SYSTOLIC BLOOD PRESSURE: 128 MMHG | RESPIRATION RATE: 17 BRPM | DIASTOLIC BLOOD PRESSURE: 56 MMHG | BODY MASS INDEX: 23.04 KG/M2 | OXYGEN SATURATION: 95 % | TEMPERATURE: 97.2 F

## 2024-08-12 DIAGNOSIS — R56.9 SEIZURE (HCC): ICD-10-CM

## 2024-08-12 DIAGNOSIS — S50.02XA CONTUSION OF LEFT ELBOW, INITIAL ENCOUNTER: ICD-10-CM

## 2024-08-12 DIAGNOSIS — S09.90XA CLOSED HEAD INJURY, INITIAL ENCOUNTER: ICD-10-CM

## 2024-08-12 LAB — EKG IMPRESSION: NORMAL

## 2024-08-12 PROCEDURE — 73080 X-RAY EXAM OF ELBOW: CPT | Mod: LT

## 2024-08-12 PROCEDURE — 99283 EMERGENCY DEPT VISIT LOW MDM: CPT

## 2024-08-12 PROCEDURE — 93005 ELECTROCARDIOGRAM TRACING: CPT | Performed by: EMERGENCY MEDICINE

## 2024-08-12 PROCEDURE — 70450 CT HEAD/BRAIN W/O DYE: CPT

## 2024-08-12 PROCEDURE — 93005 ELECTROCARDIOGRAM TRACING: CPT

## 2024-08-12 ASSESSMENT — FIBROSIS 4 INDEX: FIB4 SCORE: 0.46
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
## 2024-08-12 NOTE — ED NOTES
Bedside report received from off going RN/tech: Irene RN, assumed care of patient.  POC discussed with patient. Call light within reach, all needs addressed at this time.       Fall risk interventions in place: Patient's personal possessions are with in their safe reach, Place fall risk sign on patient's door, Give patient urinal if applicable, and Keep floor surfaces clean and dry (all applicable per [place] Fall risk assessment)   Continuous monitoring: Cardiac Leads, Pulse Ox, or Blood Pressure  IVF/IV medications: Not Applicable   Oxygen: Room Air  Bedside sitter: Not Applicable   Isolation: Not Applicable
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
## 2024-12-07 DIAGNOSIS — F39 MOOD DISORDER (HCC): ICD-10-CM

## 2024-12-07 DIAGNOSIS — B07.8 OTHER VIRAL WARTS: ICD-10-CM

## 2024-12-09 NOTE — TELEPHONE ENCOUNTER
Received request via: Pharmacy    Was the patient seen in the last year in this department? Yes    Does the patient have an active prescription (recently filled or refills available) for medication(s) requested? No    Pharmacy Name: [facility]    Does the patient have care home Plus and need 100-day supply? (This applies to ALL medications) Patient does not have SCP

## 2024-12-10 RX ORDER — HYDROXYZINE HYDROCHLORIDE 25 MG/1
25 TABLET, FILM COATED ORAL 3 TIMES DAILY PRN
Qty: 30 TABLET | Refills: 2 | Status: SHIPPED | OUTPATIENT
Start: 2024-12-10

## 2024-12-10 RX ORDER — SALICYLIC ACID 275 MG/ML
1 SOLUTION TOPICAL DAILY
Qty: 10 ML | Refills: 2 | Status: SHIPPED | OUTPATIENT
Start: 2024-12-10

## 2025-03-17 ENCOUNTER — HOSPITAL ENCOUNTER (EMERGENCY)
Facility: MEDICAL CENTER | Age: 25
End: 2025-03-17
Attending: EMERGENCY MEDICINE
Payer: MEDICAID

## 2025-03-17 VITALS
BODY MASS INDEX: 23.04 KG/M2 | DIASTOLIC BLOOD PRESSURE: 75 MMHG | OXYGEN SATURATION: 96 % | TEMPERATURE: 98.2 F | RESPIRATION RATE: 18 BRPM | WEIGHT: 152 LBS | HEART RATE: 55 BPM | HEIGHT: 68 IN | SYSTOLIC BLOOD PRESSURE: 117 MMHG

## 2025-03-17 DIAGNOSIS — R56.9 SEIZURE (HCC): ICD-10-CM

## 2025-03-17 PROCEDURE — 99284 EMERGENCY DEPT VISIT MOD MDM: CPT

## 2025-03-17 PROCEDURE — 96374 THER/PROPH/DIAG INJ IV PUSH: CPT

## 2025-03-17 PROCEDURE — 700111 HCHG RX REV CODE 636 W/ 250 OVERRIDE (IP): Mod: JZ,UD | Performed by: EMERGENCY MEDICINE

## 2025-03-17 RX ORDER — LEVETIRACETAM 500 MG/5ML
2000 INJECTION, SOLUTION, CONCENTRATE INTRAVENOUS ONCE
Status: COMPLETED | OUTPATIENT
Start: 2025-03-17 | End: 2025-03-17

## 2025-03-17 RX ADMIN — LEVETIRACETAM 2000 MG: 100 INJECTION, SOLUTION INTRAVENOUS at 09:09

## 2025-03-17 ASSESSMENT — FIBROSIS 4 INDEX: FIB4 SCORE: 0.46
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
## 2025-05-05 ENCOUNTER — HOSPITAL ENCOUNTER (EMERGENCY)
Facility: MEDICAL CENTER | Age: 25
End: 2025-05-05
Attending: EMERGENCY MEDICINE
Payer: MEDICAID

## 2025-05-05 VITALS
SYSTOLIC BLOOD PRESSURE: 121 MMHG | WEIGHT: 150 LBS | OXYGEN SATURATION: 95 % | DIASTOLIC BLOOD PRESSURE: 73 MMHG | BODY MASS INDEX: 22.73 KG/M2 | TEMPERATURE: 98.2 F | HEART RATE: 92 BPM | HEIGHT: 68 IN

## 2025-05-05 DIAGNOSIS — T07.XXXA MULTIPLE ABRASIONS: ICD-10-CM

## 2025-05-05 DIAGNOSIS — G40.409 EPILEPSY SYMPTOMATIC, GENERALIZED (HCC): ICD-10-CM

## 2025-05-05 DIAGNOSIS — Z76.0 MEDICATION REFILL: ICD-10-CM

## 2025-05-05 DIAGNOSIS — R56.9 SEIZURE (HCC): ICD-10-CM

## 2025-05-05 PROCEDURE — 99284 EMERGENCY DEPT VISIT MOD MDM: CPT

## 2025-05-05 RX ORDER — DIVALPROEX SODIUM 500 MG/1
500 TABLET, FILM COATED, EXTENDED RELEASE ORAL 2 TIMES DAILY
Qty: 60 TABLET | Refills: 1 | Status: SHIPPED | OUTPATIENT
Start: 2025-05-05

## 2025-05-05 RX ORDER — LEVETIRACETAM 750 MG/1
750 TABLET ORAL 2 TIMES DAILY
Qty: 60 TABLET | Refills: 1 | Status: SHIPPED | OUTPATIENT
Start: 2025-05-05

## 2025-05-05 RX ORDER — LAMOTRIGINE 200 MG/1
200 TABLET ORAL DAILY
Qty: 30 TABLET | Refills: 2 | Status: SHIPPED | OUTPATIENT
Start: 2025-05-05

## 2025-05-05 ASSESSMENT — FIBROSIS 4 INDEX: FIB4 SCORE: 0.46
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
## 2025-05-06 NOTE — DISCHARGE INSTRUCTIONS
Please follow-up the primary doctor for recheck.  Return the emerged department for any worsening of symptoms or any concerns if you change your mind about further testing

## 2025-06-22 ENCOUNTER — HOSPITAL ENCOUNTER (EMERGENCY)
Facility: MEDICAL CENTER | Age: 25
End: 2025-06-22
Attending: STUDENT IN AN ORGANIZED HEALTH CARE EDUCATION/TRAINING PROGRAM
Payer: MEDICAID

## 2025-06-22 VITALS
BODY MASS INDEX: 21.47 KG/M2 | TEMPERATURE: 97 F | SYSTOLIC BLOOD PRESSURE: 121 MMHG | HEIGHT: 70 IN | HEART RATE: 79 BPM | OXYGEN SATURATION: 95 % | DIASTOLIC BLOOD PRESSURE: 72 MMHG | RESPIRATION RATE: 22 BRPM | WEIGHT: 150 LBS

## 2025-06-22 DIAGNOSIS — G40.909 NONINTRACTABLE EPILEPSY WITHOUT STATUS EPILEPTICUS, UNSPECIFIED EPILEPSY TYPE (HCC): ICD-10-CM

## 2025-06-22 DIAGNOSIS — R56.9 SEIZURE (HCC): Primary | ICD-10-CM

## 2025-06-22 PROCEDURE — 99284 EMERGENCY DEPT VISIT MOD MDM: CPT

## 2025-06-22 ASSESSMENT — LIFESTYLE VARIABLES: DO YOU DRINK ALCOHOL: NO

## 2025-06-22 ASSESSMENT — FIBROSIS 4 INDEX: FIB4 SCORE: 0.46

## 2025-06-23 NOTE — ED TRIAGE NOTES
Patient to ED with complaints of seizure witnessed by friends. Lasted approx 2 minutes. Hx  epilepsy. Had a post ictal period per EMS. Arrived alert and oriented x4. HINOJOSA. FSBs 112. Missed the dose of is anti-seizure medications today.

## 2025-06-23 NOTE — ED NOTES
Pt awake and alert. He is ambulatory with a steady gait. RN was able to educate him on when to return to ER. He declined to wait for DC instructions.

## 2025-06-23 NOTE — DISCHARGE INSTRUCTIONS
You were seen and evaluated in the emergency department for your breakthrough seizure.  You refused any lab work, imaging, seizure levels.  We offered to give you a dose of your seizure medications here since you missed that dose, however you refused and wished to go home.  You are at your neurologic baseline with no signs of any trauma.  We had shared decision making the bedside.  He wished to go home, please return for any new or worsening symptoms.

## 2025-06-23 NOTE — ED PROVIDER NOTES
ER Provider Note    Scribed for Zacarias Victoria M.d. by Florinda Rivas. 6/22/2025  9:54 PM    Primary Care Provider: VERONICA Ferrell    CHIEF COMPLAINT   Chief Complaint   Patient presents with    Seizure     EXTERNAL RECORDS REVIEWED  Outpatient Notes Patient was seen at [facility] on 10/03/2024 after having a seizure episode.     HPI/ROS  LIMITATION TO HISTORY   Select: : None  OUTSIDE HISTORIAN(S):  None    Sang Magaña is a 24 y.o. male with history of seizures who presents to the ED following seizure episode. Patient states that he takes Depakote, Lamictal, and Keppra for his seizures and admits to having missed a dose today. He states that his last breakthrough seizure was a few days ago and that they only really occur when he misses a dose. Patient denies trauma or injury to head, numbness/tingling, fecal or urinary incontinence, oral trauma, and any other symptoms or injuries at this time. Patient is established with a neurologist.     PAST MEDICAL HISTORY  Past Medical History:   Diagnosis Date    Concussion with brief LOC 05/16/2023    Epilepsy (HCC)     Eyelid laceration 05/16/2023    Hypoactive thyroid 01/01/2010    Left knee pain 05/16/2023    Seizure disorder (HCC)      SURGICAL HISTORY  No past surgical history noted.     FAMILY HISTORY  No family history noted.     SOCIAL HISTORY   reports that he has never smoked. He has never used smokeless tobacco. He reports that he does not currently use alcohol. He reports that he does not currently use drugs after having used the following drugs: Inhaled and Marijuana.    CURRENT MEDICATIONS  Discharge Medication List as of 6/22/2025 10:00 PM        CONTINUE these medications which have NOT CHANGED    Details   levetiracetam (KEPPRA) 750 MG tablet Take 1 Tablet by mouth 2 times a day., Disp-60 Tablet, R-1, Normal      divalproex ER (DEPAKOTE ER) 500 MG TABLET SR 24 HR Take 1 Tablet by mouth 2 times a day., Disp-60 Tablet, R-1,  "Normal      lamotrigine (LAMICTAL) 200 MG tablet Take 1 Tablet by mouth every day., Disp-30 Tablet, R-2, Normal      levothyroxine (SYNTHROID) 88 MCG Tab Take 1 Tablet by mouth every morning on an empty stomach., Disp-90 Tablet, R-1, Normal      folic acid (FOLVITE) 1 MG Tab Take 1 Tablet by mouth every day., Disp-90 Tablet, R-1, Normal      hydrOXYzine HCl (ATARAX) 25 MG Tab Take 1 Tablet by mouth 3 times a day as needed for Anxiety., Disp-30 Tablet, R-2, Normal      Salicylic Acid 27.5 % Liquid Apply 1 Application topically every day., Disp-10 mL, R-2, Normal           ALLERGIES  Patient has no known allergies noted.     PHYSICAL EXAM  /72   Pulse 78   Resp 16   Ht 1.778 m (5' 10\")   Wt 68 kg (150 lb)   SpO2 94%   BMI 21.52 kg/m²   Constitutional: Well developed, Well nourished, No acute distress, Non-toxic appearance.   HENT: Normocephalic, Atraumatic, Bilateral external ears normal, Oropharynx is clear mucous membranes are moist. No oral exudates or nasal discharge.   Eyes: Pupils are equal round and reactive, EOMI, Conjunctiva normal, No discharge.   Neck: Normal range of motion, No tenderness, Supple, No stridor. No meningismus.  Lymphatic: No lymphadenopathy noted.   Cardiovascular: Regular rate and rhythm without murmur rub or gallop.  Thorax & Lungs: Clear breath sounds bilaterally without wheezes, rhonchi or rales. There is no chest wall tenderness.   Abdomen: Soft non-tender non-distended. There is no rebound or guarding. No organomegaly is appreciated. Bowel sounds are normal.  Skin: Normal without rash.   Back: No CVA or spinal tenderness.   Extremities: Intact distal pulses, No edema, No tenderness, No cyanosis, No clubbing. Capillary refill is less than 2 seconds.  Musculoskeletal: Good range of motion in all major joints. No tenderness to palpation or major deformities noted.   Neurologic: Alert & oriented x 3, Normal motor function, Normal sensory function, No focal deficits noted. " Reflexes are normal.  Psychiatric: Affect normal, Judgment normal, Mood normal.     COURSE & MEDICAL DECISION MAKING     ASSESSMENT, COURSE AND PLAN  Care Narrative:     9:54 PM - Patient presents to the ED with follow seizure episode.  Patient has history of seizure disorder, states that he had a breakthrough seizure today because he did not take his medications.  Patient follows up with Dr. Mir.  Patient states that he did not hit his head, he had a witnessed seizure while in his bed.  He denies any head strike, loss of consciousness, states that he was mildly confused afterwards.  He denies any tongue biting, incontinence.  Patient states he feels back his baseline.  Did recommend lab work to evaluate for any potential etiology or triggers.  Patient states that usually when he misses medication he is expected to have a breakthrough seizure.  On exam patient has no focal neurologic deficits, no signs of trauma.  At time of evaluation patient was offered work-up and medication for his seizures to which he declined. Patient states that he would like to be discharged home and will plan to take his medication at home as he lives right down the street. Patient was given the opportunity for questions which were answered appropriately.  Patient is to return to the ED for new or worsening symptoms or any additional concerns.  Patient has verbalized understanding and agreement to this plan.  Patient is stable for discharge at this time.              ADDITIONAL PROBLEM LIST  Breakthrough seizure    DISPOSITION AND DISCUSSIONS  I have discussed management of the patient with the following physicians and LAURA's:  None    Discussion of management with other Q[facility] or appropriate source(s): None     Escalation of care considered, and ultimately not performed: after discussion with the patient / family, they have elected to decline an escalation in care.    Barriers to care at this time, including but not limited to: None.        DISPOSITION:  Patient will be discharged home in stable condition.    FOLLOW UP:  VERONICA Ferrell  [address]  [place] [id number] [phone]    Schedule an appointment as soon as possible for a visit       Hina Fragoso M.D.  [address]  [address]  [facility] [id number] [phone]    Schedule an appointment as soon as possible for a visit         FINAL DIAGNOSIS  1. Seizure (HCC) Acute   2. Nonintractable epilepsy without status epilepticus, unspecified epilepsy type (HCC) Acute           Florinda MARTIN (Scribangely), am scribing for, and in the presence of, ANJEL VICTORIA MD    Electronically signed by: Florinda Rivas (Scribe), 6/22/2025    ANJEL MARTIN MD personally performed the services described in this documentation, as scribed by Florinda Rivas in my presence, and it is both accurate and complete.    The note accurately reflects work and decisions made by me.  Anjel Victoria M.D.  6/23/2025  2:00 AM